# Patient Record
Sex: FEMALE | Race: BLACK OR AFRICAN AMERICAN | Employment: UNEMPLOYED | URBAN - METROPOLITAN AREA
[De-identification: names, ages, dates, MRNs, and addresses within clinical notes are randomized per-mention and may not be internally consistent; named-entity substitution may affect disease eponyms.]

---
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
## 2019-02-25 ENCOUNTER — HOSPITAL ENCOUNTER (EMERGENCY)
Age: 19
Discharge: HOME | End: 2019-02-25
Payer: COMMERCIAL

## 2019-02-25 VITALS
TEMPERATURE: 98 F | RESPIRATION RATE: 16 BRPM | WEIGHT: 120 LBS | OXYGEN SATURATION: 98 % | SYSTOLIC BLOOD PRESSURE: 99 MMHG | BODY MASS INDEX: 19.29 KG/M2 | HEIGHT: 66 IN | HEART RATE: 90 BPM | DIASTOLIC BLOOD PRESSURE: 52 MMHG

## 2019-02-25 DIAGNOSIS — N30.00 ACUTE CYSTITIS WITHOUT HEMATURIA: Primary | ICD-10-CM

## 2019-02-25 LAB
BACTERIA: ABNORMAL /HPF
BILIRUBIN URINE: NEGATIVE
BLOOD, URINE: NEGATIVE
CLARITY: CLEAR
COLOR: YELLOW
EPITHELIAL CELLS, UA: ABNORMAL /HPF
GLUCOSE URINE: NEGATIVE MG/DL
HCG, URINE, POC: NEGATIVE
KETONES, URINE: NEGATIVE MG/DL
LEUKOCYTE ESTERASE, URINE: ABNORMAL
Lab: NORMAL
NEGATIVE QC PASS/FAIL: NORMAL
NITRITE, URINE: POSITIVE
PH UA: 5.5 (ref 5–9)
POSITIVE QC PASS/FAIL: NORMAL
PROTEIN UA: NEGATIVE MG/DL
RBC UA: ABNORMAL /HPF (ref 0–2)
SPECIFIC GRAVITY UA: 1.02 (ref 1–1.03)
UROBILINOGEN, URINE: 0.2 E.U./DL
WBC UA: ABNORMAL /HPF (ref 0–5)

## 2019-02-25 PROCEDURE — 99284 EMERGENCY DEPT VISIT MOD MDM: CPT

## 2019-02-25 PROCEDURE — 87088 URINE BACTERIA CULTURE: CPT

## 2019-02-25 PROCEDURE — 87186 SC STD MICRODIL/AGAR DIL: CPT

## 2019-02-25 PROCEDURE — 81001 URINALYSIS AUTO W/SCOPE: CPT

## 2019-02-25 RX ORDER — CEPHALEXIN 500 MG/1
500 CAPSULE ORAL 4 TIMES DAILY
Qty: 28 CAPSULE | Refills: 0 | Status: SHIPPED | OUTPATIENT
Start: 2019-02-25 | End: 2019-03-04

## 2019-02-25 ASSESSMENT — PAIN SCALES - GENERAL: PAINLEVEL_OUTOF10: 2

## 2019-02-25 ASSESSMENT — PAIN DESCRIPTION - PAIN TYPE: TYPE: ACUTE PAIN

## 2019-02-25 ASSESSMENT — PAIN DESCRIPTION - LOCATION: LOCATION: ABDOMEN

## 2019-02-27 LAB
ORGANISM: ABNORMAL
URINE CULTURE, ROUTINE: ABNORMAL
URINE CULTURE, ROUTINE: ABNORMAL

## 2019-04-13 ENCOUNTER — HOSPITAL ENCOUNTER (EMERGENCY)
Age: 19
Discharge: HOME | End: 2019-04-13
Payer: COMMERCIAL

## 2019-04-13 VITALS
SYSTOLIC BLOOD PRESSURE: 103 MMHG | HEIGHT: 66 IN | RESPIRATION RATE: 18 BRPM | WEIGHT: 121 LBS | BODY MASS INDEX: 19.44 KG/M2 | TEMPERATURE: 97.8 F | DIASTOLIC BLOOD PRESSURE: 67 MMHG | HEART RATE: 88 BPM | OXYGEN SATURATION: 98 %

## 2019-04-13 DIAGNOSIS — N30.00 ACUTE CYSTITIS WITHOUT HEMATURIA: ICD-10-CM

## 2019-04-13 DIAGNOSIS — N93.8 DYSFUNCTIONAL UTERINE BLEEDING: Primary | ICD-10-CM

## 2019-04-13 LAB
BACTERIA: ABNORMAL /HPF
BASOPHILS ABSOLUTE: 0.03 E9/L (ref 0–0.2)
BASOPHILS RELATIVE PERCENT: 0.7 % (ref 0–2)
BILIRUBIN URINE: NEGATIVE
BLOOD, URINE: ABNORMAL
CLARITY: ABNORMAL
COLOR: ABNORMAL
EOSINOPHILS ABSOLUTE: 0.02 E9/L (ref 0.05–0.5)
EOSINOPHILS RELATIVE PERCENT: 0.5 % (ref 0–6)
EPITHELIAL CELLS, UA: ABNORMAL /HPF
GLUCOSE URINE: NEGATIVE MG/DL
HCG, URINE, POC: NEGATIVE
HCT VFR BLD CALC: 33 % (ref 34–48)
HEMOGLOBIN: 10.7 G/DL (ref 11.5–15.5)
IMMATURE GRANULOCYTES #: 0.01 E9/L
IMMATURE GRANULOCYTES %: 0.2 % (ref 0–5)
KETONES, URINE: NEGATIVE MG/DL
LEUKOCYTE ESTERASE, URINE: ABNORMAL
LYMPHOCYTES ABSOLUTE: 1.36 E9/L (ref 1.5–4)
LYMPHOCYTES RELATIVE PERCENT: 31.2 % (ref 20–42)
Lab: NORMAL
MCH RBC QN AUTO: 23.4 PG (ref 26–35)
MCHC RBC AUTO-ENTMCNC: 32.4 % (ref 32–34.5)
MCV RBC AUTO: 72.2 FL (ref 80–99.9)
MONOCYTES ABSOLUTE: 0.51 E9/L (ref 0.1–0.95)
MONOCYTES RELATIVE PERCENT: 11.7 % (ref 2–12)
NEGATIVE QC PASS/FAIL: NORMAL
NEUTROPHILS ABSOLUTE: 2.43 E9/L (ref 1.8–7.3)
NEUTROPHILS RELATIVE PERCENT: 55.7 % (ref 43–80)
NITRITE, URINE: POSITIVE
PDW BLD-RTO: 15.9 FL (ref 11.5–15)
PH UA: 5.5 (ref 5–9)
PLATELET # BLD: 293 E9/L (ref 130–450)
PMV BLD AUTO: 10.2 FL (ref 7–12)
POSITIVE QC PASS/FAIL: NORMAL
PROTEIN UA: 30 MG/DL
RBC # BLD: 4.57 E12/L (ref 3.5–5.5)
RBC UA: >20 /HPF (ref 0–2)
SPECIFIC GRAVITY UA: 1.01 (ref 1–1.03)
UROBILINOGEN, URINE: 0.2 E.U./DL
WBC # BLD: 4.4 E9/L (ref 4.5–11.5)
WBC UA: ABNORMAL /HPF (ref 0–5)

## 2019-04-13 PROCEDURE — 99284 EMERGENCY DEPT VISIT MOD MDM: CPT

## 2019-04-13 PROCEDURE — 36415 COLL VENOUS BLD VENIPUNCTURE: CPT

## 2019-04-13 PROCEDURE — 81001 URINALYSIS AUTO W/SCOPE: CPT

## 2019-04-13 PROCEDURE — 85025 COMPLETE CBC W/AUTO DIFF WBC: CPT

## 2019-04-13 PROCEDURE — 6370000000 HC RX 637 (ALT 250 FOR IP): Performed by: PHYSICIAN ASSISTANT

## 2019-04-13 RX ORDER — NITROFURANTOIN 25; 75 MG/1; MG/1
100 CAPSULE ORAL 2 TIMES DAILY
Qty: 10 CAPSULE | Refills: 0 | Status: SHIPPED | OUTPATIENT
Start: 2019-04-13 | End: 2019-04-18

## 2019-04-13 RX ORDER — IBUPROFEN 600 MG/1
600 TABLET ORAL ONCE
Status: COMPLETED | OUTPATIENT
Start: 2019-04-13 | End: 2019-04-13

## 2019-04-13 RX ORDER — IBUPROFEN 600 MG/1
600 TABLET ORAL EVERY 6 HOURS PRN
Qty: 60 TABLET | Refills: 0 | Status: SHIPPED | OUTPATIENT
Start: 2019-04-13 | End: 2019-10-18

## 2019-04-13 RX ADMIN — IBUPROFEN 600 MG: 600 TABLET, FILM COATED ORAL at 16:12

## 2019-04-13 ASSESSMENT — PAIN SCALES - GENERAL
PAINLEVEL_OUTOF10: 5
PAINLEVEL_OUTOF10: 6

## 2019-04-13 ASSESSMENT — PAIN DESCRIPTION - LOCATION: LOCATION: ABDOMEN

## 2019-04-13 ASSESSMENT — PAIN DESCRIPTION - PAIN TYPE: TYPE: ACUTE PAIN

## 2019-04-13 ASSESSMENT — PAIN DESCRIPTION - ORIENTATION: ORIENTATION: LOWER;RIGHT;LEFT

## 2019-04-13 NOTE — ED PROVIDER NOTES
[facility]      HPI:  4/13/19,   Time: 3:27 PM         Erik Huddleston is a 20 y.o. female presenting to the ED for vaginal bleeding, beginning 1 day ago. The complaint has been persistent, moderate in severity, and worsened by nothing. The patient is a 24-year-old young girl who states that she had her last Depo injection last fall. She reports that she would've been due for her next shot in February. The patient states that she skipped cycles in February and March. Last night she started with heavy vaginal bleeding. She reports that she's been changing her tampon very frequently. She denies any dizziness or lightheadedness. She is having some mild cramping diffusely across the lower abdominal region and pelvis. The patient states that she does not believe she is pregnant. Her pregnancy test here in the ED is negative. She reports that she just had a pelvic exam within the last few weeks; quite center with cultures. No vaginal discharge reported        ROS:     Constitutional: Negative for fever and chills  HENT: Negative for ear pain, sore throat and sinus pressure  Eyes: Negative for pain, discharge and redness  Respiratory:  Negative for shortness of breath, cough and wheezing  Cardiovascular: Negative for CP, edema or palpitations  Gastrointestinal:  See HPI  Genitourinary:  See HPI  Musculoskeletal: Negative for back pain and arthralgia  Skin: Negative for rash and wound  Neurological: Negative for weakness and headaches  Hematological: Negative for adenopathy    All other systems reviewed and are negative      -------------------------------- PAST HISTORY ----------------------------------  Past Medical History:  has a past medical history of Anemia and Sickle cell trait ([facility] [address] Past Surgical History:  has no past surgical history on file. Social History:  reports that she has never smoked. She has never used smokeless tobacco. She reports that she does not drink alcohol or use drugs.     Family POC Pregnancy Urine   Result Value Ref Range    HCG, Urine, POC Negative Negative    Lot Number [id number]     Positive QC Pass/Fail Pass     Negative QC Pass/Fail Pass        RADIOLOGY:  Interpreted by Radiologist.  No orders to display       ----------------- NURSING NOTES AND VITALS REVIEWED ---------------   The nursing notes within the ED encounter and vital signs as below have been reviewed. /67   Pulse 88   Temp 97.8 °F (36.6 °C) (Oral)   Resp 18   Ht 5' 6\" (1.676 m)   Wt 121 lb (54.9 kg)   LMP 12/19/2018 (Within Months)   SpO2 98%   BMI 19.53 kg/m²   Oxygen Saturation Interpretation: Normal      --------------------------------PHYSICAL EXAM------------------------------------      Constitutional/General: Alert and oriented x3, well appearing, non toxic in NAD  Head: NC/AT  Eyes: PERRL, EOMI  Mouth: Oropharynx clear, handling secretions, no trismus  Neck: Supple, full ROM, no meningeal signs  Pulmonary: Lungs clear to auscultation bilaterally, no wheezes, rales, or rhonchi. Not in respiratory distress  Cardiovascular:  Regular rate and rhythm, no murmurs, gallops, or rubs. 2+ distal pulses  Abdomen: Soft, + BS. No distension. Very mild suprapubic discomfort noted. No palpable rigidity, rebound or guarding  Extremities: Moves all extremities x 4. Warm and well perfused  Skin: warm and dry without rash  Neurologic: GCS 15,  Intact. No focal deficits  Psych: Normal Affect      ------------------------ ED COURSE/MEDICAL DECISION MAKING----------------------  Medications   ibuprofen (ADVIL;MOTRIN) tablet 600 mg (600 mg Oral Given 4/13/19 1612)         Medical Decision Making:    NH is stable. She is having heavier than usual flow secondary to having skipped the last 3 menstrual cycles and probably even more prior to this with the Depakote. She is not pregnant. She has some mild bacteria and possible early infection in the urine. Plan discharge home with Motrin and Macrobid.  Close follow-up

## 2019-08-31 ENCOUNTER — HOSPITAL ENCOUNTER (EMERGENCY)
Age: 19
Discharge: HOME | End: 2019-08-31
Payer: COMMERCIAL

## 2019-08-31 VITALS
SYSTOLIC BLOOD PRESSURE: 103 MMHG | RESPIRATION RATE: 16 BRPM | HEART RATE: 86 BPM | OXYGEN SATURATION: 99 % | DIASTOLIC BLOOD PRESSURE: 67 MMHG | TEMPERATURE: 98.1 F | HEIGHT: 66 IN | WEIGHT: 116 LBS | BODY MASS INDEX: 18.64 KG/M2

## 2019-08-31 DIAGNOSIS — R30.0 DYSURIA: Primary | ICD-10-CM

## 2019-08-31 LAB
BILIRUBIN URINE: NEGATIVE
BLOOD, URINE: NEGATIVE
CLARITY: CLEAR
COLOR: YELLOW
GLUCOSE URINE: NEGATIVE MG/DL
HCG, URINE, POC: NEGATIVE
KETONES, URINE: NEGATIVE MG/DL
LEUKOCYTE ESTERASE, URINE: NEGATIVE
Lab: NORMAL
NEGATIVE QC PASS/FAIL: NORMAL
NITRITE, URINE: NEGATIVE
PH UA: 5.5 (ref 5–9)
POSITIVE QC PASS/FAIL: NORMAL
PROTEIN UA: NEGATIVE MG/DL
SPECIFIC GRAVITY UA: >=1.03 (ref 1–1.03)
UROBILINOGEN, URINE: 0.2 E.U./DL

## 2019-08-31 PROCEDURE — 81003 URINALYSIS AUTO W/O SCOPE: CPT

## 2019-08-31 PROCEDURE — 87186 SC STD MICRODIL/AGAR DIL: CPT

## 2019-08-31 PROCEDURE — 6370000000 HC RX 637 (ALT 250 FOR IP): Performed by: PHYSICIAN ASSISTANT

## 2019-08-31 PROCEDURE — 87077 CULTURE AEROBIC IDENTIFY: CPT

## 2019-08-31 PROCEDURE — 99283 EMERGENCY DEPT VISIT LOW MDM: CPT

## 2019-08-31 PROCEDURE — 87088 URINE BACTERIA CULTURE: CPT

## 2019-08-31 RX ORDER — PHENAZOPYRIDINE HYDROCHLORIDE 100 MG/1
100 TABLET, FILM COATED ORAL 3 TIMES DAILY PRN
Qty: 9 TABLET | Refills: 0 | Status: SHIPPED | OUTPATIENT
Start: 2019-08-31 | End: 2019-09-03

## 2019-08-31 RX ORDER — NAPROXEN 500 MG/1
500 TABLET ORAL 2 TIMES DAILY
Qty: 14 TABLET | Refills: 0 | Status: SHIPPED | OUTPATIENT
Start: 2019-08-31 | End: 2019-10-18

## 2019-08-31 RX ORDER — PHENAZOPYRIDINE HYDROCHLORIDE 100 MG/1
200 TABLET, FILM COATED ORAL ONCE
Status: COMPLETED | OUTPATIENT
Start: 2019-08-31 | End: 2019-08-31

## 2019-08-31 RX ADMIN — PHENAZOPYRIDINE HYDROCHLORIDE 200 MG: 100 TABLET ORAL at 19:50

## 2019-08-31 ASSESSMENT — PAIN SCALES - GENERAL: PAINLEVEL_OUTOF10: 5

## 2019-08-31 NOTE — ED PROVIDER NOTES
Clarity, UA Clear Clear    Glucose, Ur Negative Negative mg/dL    Bilirubin Urine Negative Negative    Ketones, Urine Negative Negative mg/dL    Specific Gravity, UA >=1.030 1.005 - 1.030    Blood, Urine Negative Negative    pH, UA 5.5 5.0 - 9.0    Protein, UA Negative Negative mg/dL    Urobilinogen, Urine 0.2 <2.0 E.U./dL    Nitrite, Urine Negative Negative    Leukocyte Esterase, Urine Negative Negative   POC Pregnancy Urine Qual   Result Value Ref Range    HCG, Urine, POC Negative Negative    Lot Number [id number]     Positive QC Pass/Fail Pass     Negative QC Pass/Fail Pass        RADIOLOGY:  Interpreted by Radiologist.  No orders to display       ------------------------- NURSING NOTES AND VITALS REVIEWED ---------------------------   The nursing notes within the ED encounter and vital signs as below have been reviewed. /67   Pulse 86   Temp 98.1 °F (36.7 °C) (Oral)   Resp 16   Ht 5' 6\" (1.676 m)   Wt 116 lb (52.6 kg)   LMP 06/30/2019 (LMP Unknown)   SpO2 99%   BMI 18.72 kg/m²   Oxygen Saturation Interpretation: Normal      ---------------------------------------------------PHYSICAL EXAM--------------------------------------      Constitutional/General: Alert and oriented x3, well appearing, non toxic in NAD  Head: Normocephalic and atraumatic  Eyes: PERRL, EOMI  Mouth: Oropharynx clear, handling secretions, no trismus  Neck: Supple, full ROM,   Pulmonary: Lungs clear to auscultation bilaterally, no wheezes, rales, or rhonchi. Not in respiratory distress  Cardiovascular:  Regular rate and rhythm, no murmurs, gallops, or rubs. 2+ distal pulses  Abdomen: Soft, non tender, non distended, no CVA tenderness  Extremities: Moves all extremities x 4.  Warm and well perfused  Skin: warm and dry without rash  Neurologic: GCS 15,  Psych: Normal Affect      ------------------------------ ED COURSE/MEDICAL DECISION MAKING----------------------  Medications   phenazopyridine (PYRIDIUM) tablet 200 mg (200 mg Oral Given 8/31/19 1950)         ED COURSE:     Review of pharmacy records shows patient is presently on Cipro had taken Macrobid prior    [birth year]25 Patient updated       Medical Decision Making:    She came in with complaint of urinary frequency urgency burning. She is presently on Cipro being treated for UTI was given Macrobid prior. Urine here is normal.  Culture was sent she was placed on Pyridium she is to follow-up with her primary care on Tuesday for evaluation of the culture and any further management. She is also to follow-up with OB/GYN on Tuesday. Counseling: The emergency provider has spoken with the patient and discussed todays results, in addition to providing specific details for the plan of care and counseling regarding the diagnosis and prognosis. Questions are answered at this time and they are agreeable with the plan.      --------------------------------- IMPRESSION AND DISPOSITION ---------------------------------    IMPRESSION  1. Dysuria        DISPOSITION  Disposition: Discharge to home  Patient condition is good      NOTE: This report was transcribed using voice recognition software.  Every effort was made to ensure accuracy; however, inadvertent computerized transcription errors may be present     Quan De Los Santos, [address]  08/31/19 1679

## 2019-09-03 LAB
ORGANISM: ABNORMAL
URINE CULTURE, ROUTINE: ABNORMAL

## 2019-10-18 ENCOUNTER — HOSPITAL ENCOUNTER (EMERGENCY)
Age: 19
Discharge: HOME | End: 2019-10-18
Attending: EMERGENCY MEDICINE
Payer: COMMERCIAL

## 2019-10-18 VITALS
DIASTOLIC BLOOD PRESSURE: 70 MMHG | OXYGEN SATURATION: 100 % | WEIGHT: 119 LBS | BODY MASS INDEX: 19.21 KG/M2 | RESPIRATION RATE: 16 BRPM | SYSTOLIC BLOOD PRESSURE: 105 MMHG | HEART RATE: 92 BPM | TEMPERATURE: 98 F

## 2019-10-18 DIAGNOSIS — K08.89 PAIN, DENTAL: Primary | ICD-10-CM

## 2019-10-18 DIAGNOSIS — G44.209 TENSION HEADACHE: ICD-10-CM

## 2019-10-18 DIAGNOSIS — K08.89 DENTALGIA: ICD-10-CM

## 2019-10-18 PROCEDURE — G0382 LEV 3 HOSP TYPE B ED VISIT: HCPCS

## 2019-10-18 RX ORDER — KETOROLAC TROMETHAMINE 10 MG/1
10 TABLET, FILM COATED ORAL EVERY 6 HOURS PRN
Qty: 20 TABLET | Refills: 0 | Status: SHIPPED | OUTPATIENT
Start: 2019-10-18 | End: 2020-07-27

## 2019-10-18 RX ORDER — AMOXICILLIN AND CLAVULANATE POTASSIUM 875; 125 MG/1; MG/1
1 TABLET, FILM COATED ORAL 2 TIMES DAILY
Qty: 20 TABLET | Refills: 0 | Status: SHIPPED | OUTPATIENT
Start: 2019-10-18 | End: 2019-10-28

## 2019-10-18 ASSESSMENT — ENCOUNTER SYMPTOMS
EYES NEGATIVE: 1
RESPIRATORY NEGATIVE: 1
GASTROINTESTINAL NEGATIVE: 1
ALLERGIC/IMMUNOLOGIC NEGATIVE: 1

## 2019-10-18 ASSESSMENT — PAIN DESCRIPTION - FREQUENCY: FREQUENCY: CONTINUOUS

## 2019-10-18 ASSESSMENT — PAIN SCALES - GENERAL: PAINLEVEL_OUTOF10: 8

## 2019-10-18 ASSESSMENT — PAIN DESCRIPTION - DESCRIPTORS: DESCRIPTORS: ACHING;THROBBING

## 2019-10-18 ASSESSMENT — PAIN DESCRIPTION - PAIN TYPE: TYPE: ACUTE PAIN

## 2019-10-18 ASSESSMENT — PAIN DESCRIPTION - LOCATION: LOCATION: HEAD;MOUTH

## 2019-10-18 ASSESSMENT — PAIN DESCRIPTION - PROGRESSION
CLINICAL_PROGRESSION: NOT CHANGED
CLINICAL_PROGRESSION: NOT CHANGED

## 2020-07-27 ENCOUNTER — INITIAL PRENATAL (OUTPATIENT)
Dept: OBGYN CLINIC | Age: 20
End: 2020-07-27
Payer: COMMERCIAL

## 2020-07-27 VITALS
SYSTOLIC BLOOD PRESSURE: 102 MMHG | TEMPERATURE: 97.2 F | HEART RATE: 74 BPM | WEIGHT: 141.4 LBS | DIASTOLIC BLOOD PRESSURE: 64 MMHG | BODY MASS INDEX: 22.82 KG/M2

## 2020-07-27 PROBLEM — Z3A.31 31 WEEKS GESTATION OF PREGNANCY: Status: ACTIVE | Noted: 2020-07-27

## 2020-07-27 LAB
GLUCOSE URINE, POC: NEGATIVE
PROTEIN UA: NEGATIVE

## 2020-07-27 PROCEDURE — 81002 URINALYSIS NONAUTO W/O SCOPE: CPT | Performed by: OBSTETRICS & GYNECOLOGY

## 2020-07-27 PROCEDURE — 76819 FETAL BIOPHYS PROFIL W/O NST: CPT | Performed by: OBSTETRICS & GYNECOLOGY

## 2020-07-27 PROCEDURE — 99203 OFFICE O/P NEW LOW 30 MIN: CPT | Performed by: OBSTETRICS & GYNECOLOGY

## 2020-07-27 PROCEDURE — 99213 OFFICE O/P EST LOW 20 MIN: CPT | Performed by: OBSTETRICS & GYNECOLOGY

## 2020-07-27 PROCEDURE — 76811 OB US DETAILED SNGL FETUS: CPT | Performed by: OBSTETRICS & GYNECOLOGY

## 2020-07-27 NOTE — PATIENT INSTRUCTIONS
Baby's Kicks: Care Instructions. \"     If you do not have an account, please click on the \"Sign Up Now\" link. Current as of: 2020               Content Version: 12.5   [employer]. Care instructions adapted under license by [employer] [facility] ([facility]). If you have questions about a medical condition or this instruction, always ask your healthcare professional. [place] [address] any warranty or liability for your use of this information. Patient Education        Weeks 30 to 28 of Your Pregnancy: Care Instructions  Your Care Instructions     You have made it to the final months of your pregnancy. By now, your baby is really starting to look like a baby, with hair and plump skin. As you enter the final weeks of pregnancy, the reality of having a baby may start to set in. This is the time to settle on a name, get your household in order, set up a safe nursery, and find quality  if needed. Doing these things in advance will allow you to focus on caring for and enjoying your new baby. You may also want to have a tour of your hospital's labor and delivery unit to get a better idea of what to expect while you are in the hospital.  During these last months, it is very important to take good care of yourself and pay attention to what your body needs. If your doctor says it is okay for you to work, don't push yourself too hard. Use the tips provided in this care sheet to ease heartburn and care for varicose veins. If you haven't already had the Tdap shot during this pregnancy, talk to your doctor about getting it. It will help protect your  against pertussis infection. Follow-up care is a key part of your treatment and safety. Be sure to make and go to all appointments, and call your doctor if you are having problems. It's also a good idea to know your test results and keep a list of the medicines you take. How can you care for yourself at home?   Pay attention to your baby's movements  · You should feel your baby move several times every day. · Your baby now turns less, and kicks and jabs more. · Your baby sleeps 20 to 45 minutes at a time and is more active at certain times of day. · If your doctor wants you to count your baby's kicks:  ? Empty your bladder, and lie on your side or relax in a comfortable chair. ? Write down your start time. ? Pay attention only to your baby's movements. Count any movement except hiccups. ? After you have counted 10 movements, write down your stop time. ? Write down how many minutes it took for your baby to move 10 times. ? If an hour goes by and you have not recorded 10 movements, have something to eat or drink and then count for another hour. If you do not record 10 movements in either hour, call your doctor. Ease heartburn  · Eat small, frequent meals. · Do not eat chocolate, peppermint, or very spicy foods. Avoid drinks with caffeine, such as coffee, tea, and sodas. · Avoid bending over or lying down after meals. · Talk a short walk after you eat. · If heartburn is a problem at night, do not eat for 2 hours before bedtime. · Take antacids like Mylanta, Maalox, Rolaids, or Tums. Do not take antacids that have sodium bicarbonate. Care for varicose veins  · Varicose veins are blood vessels that stretch out with the extra blood during pregnancy. Your legs may ache or throb. Most varicose veins will go away after the birth. · Avoid standing for long periods of time. Sit with your legs crossed at the ankles, not the knees. · Sit with your feet propped up. · Avoid tight clothing or stockings. Wear support hose. · Exercise regularly. Try walking for at least 30 minutes a day. Where can you learn more? Go to https://edyta.[employer]. org and sign in to your [employer] account. Enter B100 in the [employer] box to learn more about \"Weeks 30 to 32 of Your Pregnancy: Care Instructions. \"     If you do not have an account, please click on the \"Sign Up Now\" link. Current as of: February 11, 2020               Content Version: 12.5  © 2006-2020 [employer]. Care instructions adapted under license by [facility] ([facility]). If you have questions about a medical condition or this instruction, always ask your healthcare professional. [address] any warranty or liability for your use of this information.

## 2020-07-27 NOTE — PROGRESS NOTES
[facility] MATERNAL FETAL MEDICINE  [facility] [address]  Ph: [phone]    Fax: [phone]   July 27, 2020  RE: Madan Lilly [birth year]  Dear Dr. Brumfield Sneha:       Thank you for sending   Ms. eGiger   for ultrasound  in our office   on 7/27/20. Med Hx: MOB Sickle Trait, THC exposure.:   An ultrasound evaluation was done today. Please refer to the enclosed copy of the ultrasound report for further detailed information. ULTRASOUND IMPRESSION:    ? Within developmental, habitus and technical limits of ultrasound assessment,   ? Vertex Female (?) @  31w  ? Active, responsive baby. The amniotic fluid volume appears normal.   ? The fetus is measuring appropriate for gestational age. ? There has been good interval growth and development. ? Fetal anatomy was reviewed and appears normal.  ? Soft markers for aneuploidy are examined and found to be favorable. - ~Many of the important findings cannot be fully assessed at this gestational age  ? The biophysical profile is reassuring with a score of 8/8.   ? Patient notes fetal movement, no cramping or contraction. No undue tenderness or distress during exam.   DISCUSSION; Overall favorable report today. RECOMMENDATIONS:  1. The patient is to continue to follow with you in your office for ongoing obstetric care. 2. Further evaluation and management will be dependent on her clinical presentation and the results of her testing. Once again, thank you for allowing us to participate in the care of this patient and if we can be of any further assistance to you, please do not hesitate to contact us. Sincerely,      Jody Coreas MD    I spent 16 minutes with the patient of which greater than 50% of the time was used to  the patient, discuss complications and problems related to her pregnancy, or coordinating her care. I answered all of her questions to her satisfaction.

## 2020-07-27 NOTE — PROGRESS NOTES
States baby is active Denies bleeding leakage of fluid or contractions. Perineal pressure noted  Do kick counts daily  Good to do same time of day for the same amount of time  Should have 10 kick /1 hour, if less: decrease in fetal movement call OB. If not able to get OB, go directly to L&D due to decreased fetal movement  Call your obstetrician for:    Bleeding, leakage of fluid, abdominal tenderness, headaches, burred vision or  epigastric pain or decreased fetal movement or increased in urinary frequency.    Call if any questions or concerns

## 2020-09-21 ENCOUNTER — ANESTHESIA EVENT (OUTPATIENT)
Dept: LABOR AND DELIVERY | Age: 20
DRG: 560 | End: 2020-09-21
Payer: COMMERCIAL

## 2020-09-21 ENCOUNTER — ANESTHESIA (OUTPATIENT)
Dept: LABOR AND DELIVERY | Age: 20
DRG: 560 | End: 2020-09-21
Payer: COMMERCIAL

## 2020-09-21 ENCOUNTER — APPOINTMENT (OUTPATIENT)
Dept: LABOR AND DELIVERY | Age: 20
DRG: 560 | End: 2020-09-21
Payer: COMMERCIAL

## 2020-09-21 ENCOUNTER — HOSPITAL ENCOUNTER (INPATIENT)
Age: 20
LOS: 2 days | Discharge: HOME | DRG: 560 | End: 2020-09-23
Attending: OBSTETRICS & GYNECOLOGY | Admitting: OBSTETRICS & GYNECOLOGY
Payer: COMMERCIAL

## 2020-09-21 PROBLEM — O26.90 COMPLICATED PREGNANCY, UNSPECIFIED TRIMESTER: Status: ACTIVE | Noted: 2020-09-21

## 2020-09-21 LAB
ABO/RH: NORMAL
AMNISURE, POC: POSITIVE
AMPHETAMINE SCREEN, URINE: NOT DETECTED
ANION GAP SERPL CALCULATED.3IONS-SCNC: 12 MMOL/L (ref 7–16)
ANISOCYTOSIS: ABNORMAL
ANTIBODY SCREEN: NORMAL
BARBITURATE SCREEN URINE: NOT DETECTED
BASOPHILS ABSOLUTE: 0 E9/L (ref 0–0.2)
BASOPHILS RELATIVE PERCENT: 0.1 % (ref 0–2)
BENZODIAZEPINE SCREEN, URINE: NOT DETECTED
BUN BLDV-MCNC: 3 MG/DL (ref 6–20)
BURR CELLS: ABNORMAL
CALCIUM SERPL-MCNC: 9.1 MG/DL (ref 8.6–10.2)
CANNABINOID SCREEN URINE: POSITIVE
CHLORIDE BLD-SCNC: 101 MMOL/L (ref 98–107)
CO2: 19 MMOL/L (ref 22–29)
COCAINE METABOLITE SCREEN URINE: NOT DETECTED
CREAT SERPL-MCNC: 0.5 MG/DL (ref 0.5–1)
EOSINOPHILS ABSOLUTE: 0.07 E9/L (ref 0.05–0.5)
EOSINOPHILS RELATIVE PERCENT: 0.9 % (ref 0–6)
FENTANYL SCREEN, URINE: NOT DETECTED
GFR AFRICAN AMERICAN: >60
GFR NON-AFRICAN AMERICAN: >60 ML/MIN/1.73
GLUCOSE BLD-MCNC: 133 MG/DL (ref 74–99)
HCT VFR BLD CALC: 28.5 % (ref 34–48)
HEMOGLOBIN: 8.7 G/DL (ref 11.5–15.5)
HYPOCHROMIA: ABNORMAL
LYMPHOCYTES ABSOLUTE: 1.14 E9/L (ref 1.5–4)
LYMPHOCYTES RELATIVE PERCENT: 14.8 % (ref 20–42)
Lab: ABNORMAL
Lab: NORMAL
MCH RBC QN AUTO: 20.1 PG (ref 26–35)
MCHC RBC AUTO-ENTMCNC: 30.5 % (ref 32–34.5)
MCV RBC AUTO: 66 FL (ref 80–99.9)
METHADONE SCREEN, URINE: NOT DETECTED
MONOCYTES ABSOLUTE: 0.3 E9/L (ref 0.1–0.95)
MONOCYTES RELATIVE PERCENT: 4.3 % (ref 2–12)
NEGATIVE QC PASS/FAIL: NORMAL
NEUTROPHILS ABSOLUTE: 6 E9/L (ref 1.8–7.3)
NEUTROPHILS RELATIVE PERCENT: 79.1 % (ref 43–80)
OPIATE SCREEN URINE: NOT DETECTED
OXYCODONE URINE: NOT DETECTED
PDW BLD-RTO: 16.2 FL (ref 11.5–15)
PHENCYCLIDINE SCREEN URINE: NOT DETECTED
PLASMA CELLS PERCENT: 0.9 % (ref 0–0)
PLATELET # BLD: 257 E9/L (ref 130–450)
PMV BLD AUTO: 10.9 FL (ref 7–12)
POIKILOCYTES: ABNORMAL
POLYCHROMASIA: ABNORMAL
POSITIVE QC PASS/FAIL: NORMAL
POTASSIUM SERPL-SCNC: 3.2 MMOL/L (ref 3.5–5)
RBC # BLD: 4.32 E12/L (ref 3.5–5.5)
SODIUM BLD-SCNC: 132 MMOL/L (ref 132–146)
WBC # BLD: 7.6 E9/L (ref 4.5–11.5)

## 2020-09-21 PROCEDURE — 85025 COMPLETE CBC W/AUTO DIFF WBC: CPT

## 2020-09-21 PROCEDURE — 36415 COLL VENOUS BLD VENIPUNCTURE: CPT

## 2020-09-21 PROCEDURE — 80307 DRUG TEST PRSMV CHEM ANLYZR: CPT

## 2020-09-21 PROCEDURE — G0480 DRUG TEST DEF 1-7 CLASSES: HCPCS

## 2020-09-21 PROCEDURE — 2500000003 HC RX 250 WO HCPCS: Performed by: ANESTHESIOLOGY

## 2020-09-21 PROCEDURE — 7200000001 HC VAGINAL DELIVERY

## 2020-09-21 PROCEDURE — 80048 BASIC METABOLIC PNL TOTAL CA: CPT

## 2020-09-21 PROCEDURE — 84112 EVAL AMNIOTIC FLUID PROTEIN: CPT

## 2020-09-21 PROCEDURE — 86901 BLOOD TYPING SEROLOGIC RH(D): CPT

## 2020-09-21 PROCEDURE — 6370000000 HC RX 637 (ALT 250 FOR IP): Performed by: OBSTETRICS & GYNECOLOGY

## 2020-09-21 PROCEDURE — 51702 INSERT TEMP BLADDER CATH: CPT

## 2020-09-21 PROCEDURE — 86900 BLOOD TYPING SEROLOGIC ABO: CPT

## 2020-09-21 PROCEDURE — 3700000025 EPIDURAL BLOCK: Performed by: ANESTHESIOLOGY

## 2020-09-21 PROCEDURE — 2580000003 HC RX 258: Performed by: OBSTETRICS & GYNECOLOGY

## 2020-09-21 PROCEDURE — 1220000001 HC SEMI PRIVATE L&D R&B

## 2020-09-21 PROCEDURE — 86850 RBC ANTIBODY SCREEN: CPT

## 2020-09-21 PROCEDURE — 6360000002 HC RX W HCPCS: Performed by: OBSTETRICS & GYNECOLOGY

## 2020-09-21 RX ORDER — ONDANSETRON 2 MG/ML
4 INJECTION INTRAMUSCULAR; INTRAVENOUS EVERY 6 HOURS PRN
Status: DISCONTINUED | OUTPATIENT
Start: 2020-09-21 | End: 2020-09-21

## 2020-09-21 RX ORDER — ACETAMINOPHEN 325 MG/1
650 TABLET ORAL EVERY 4 HOURS PRN
Status: DISCONTINUED | OUTPATIENT
Start: 2020-09-21 | End: 2020-09-23 | Stop reason: HOSPADM

## 2020-09-21 RX ORDER — METHYLERGONOVINE MALEATE 0.2 MG/ML
200 INJECTION INTRAVENOUS
Status: ACTIVE | OUTPATIENT
Start: 2020-09-21 | End: 2020-09-21

## 2020-09-21 RX ORDER — IBUPROFEN 600 MG/1
600 TABLET ORAL EVERY 6 HOURS PRN
Status: DISCONTINUED | OUTPATIENT
Start: 2020-09-21 | End: 2020-09-23 | Stop reason: HOSPADM

## 2020-09-21 RX ORDER — EPHEDRINE SULFATE/0.9% NACL/PF 50 MG/5 ML
5 SYRINGE (ML) INTRAVENOUS PRN
Status: DISCONTINUED | OUTPATIENT
Start: 2020-09-21 | End: 2020-09-21

## 2020-09-21 RX ORDER — NALBUPHINE HCL 10 MG/ML
5 AMPUL (ML) INJECTION EVERY 4 HOURS PRN
Status: DISCONTINUED | OUTPATIENT
Start: 2020-09-21 | End: 2020-09-21

## 2020-09-21 RX ORDER — 0.9 % SODIUM CHLORIDE 0.9 %
500 INTRAVENOUS SOLUTION INTRAVENOUS ONCE
Status: DISCONTINUED | OUTPATIENT
Start: 2020-09-21 | End: 2020-09-23 | Stop reason: HOSPADM

## 2020-09-21 RX ORDER — MISOPROSTOL 200 UG/1
800 TABLET ORAL PRN
Status: DISCONTINUED | OUTPATIENT
Start: 2020-09-21 | End: 2020-09-23 | Stop reason: HOSPADM

## 2020-09-21 RX ORDER — DOCUSATE SODIUM 100 MG/1
100 CAPSULE, LIQUID FILLED ORAL 2 TIMES DAILY
Status: DISCONTINUED | OUTPATIENT
Start: 2020-09-21 | End: 2020-09-23 | Stop reason: HOSPADM

## 2020-09-21 RX ORDER — SODIUM CHLORIDE 0.9 % (FLUSH) 0.9 %
10 SYRINGE (ML) INJECTION EVERY 12 HOURS SCHEDULED
Status: DISCONTINUED | OUTPATIENT
Start: 2020-09-21 | End: 2020-09-21

## 2020-09-21 RX ORDER — NALOXONE HYDROCHLORIDE 0.4 MG/ML
0.4 INJECTION, SOLUTION INTRAMUSCULAR; INTRAVENOUS; SUBCUTANEOUS PRN
Status: DISCONTINUED | OUTPATIENT
Start: 2020-09-21 | End: 2020-09-21

## 2020-09-21 RX ORDER — SODIUM CHLORIDE 0.9 % (FLUSH) 0.9 %
10 SYRINGE (ML) INJECTION PRN
Status: DISCONTINUED | OUTPATIENT
Start: 2020-09-21 | End: 2020-09-21

## 2020-09-21 RX ORDER — SODIUM CHLORIDE 0.9 % (FLUSH) 0.9 %
10 SYRINGE (ML) INJECTION PRN
Status: DISCONTINUED | OUTPATIENT
Start: 2020-09-21 | End: 2020-09-23 | Stop reason: HOSPADM

## 2020-09-21 RX ORDER — MODIFIED LANOLIN
OINTMENT (GRAM) TOPICAL PRN
Status: DISCONTINUED | OUTPATIENT
Start: 2020-09-21 | End: 2020-09-23 | Stop reason: HOSPADM

## 2020-09-21 RX ORDER — SODIUM CHLORIDE 0.9 % (FLUSH) 0.9 %
10 SYRINGE (ML) INJECTION EVERY 12 HOURS SCHEDULED
Status: DISCONTINUED | OUTPATIENT
Start: 2020-09-21 | End: 2020-09-23 | Stop reason: HOSPADM

## 2020-09-21 RX ORDER — SODIUM CHLORIDE, SODIUM LACTATE, POTASSIUM CHLORIDE, CALCIUM CHLORIDE 600; 310; 30; 20 MG/100ML; MG/100ML; MG/100ML; MG/100ML
INJECTION, SOLUTION INTRAVENOUS CONTINUOUS
Status: DISCONTINUED | OUTPATIENT
Start: 2020-09-21 | End: 2020-09-21

## 2020-09-21 RX ADMIN — DOCUSATE SODIUM 100 MG: 100 CAPSULE, LIQUID FILLED ORAL at 20:41

## 2020-09-21 RX ADMIN — IBUPROFEN 600 MG: 600 TABLET ORAL at 14:21

## 2020-09-21 RX ADMIN — Medication 15 ML/HR: at 11:03

## 2020-09-21 RX ADMIN — IBUPROFEN 600 MG: 600 TABLET ORAL at 20:41

## 2020-09-21 RX ADMIN — SODIUM CHLORIDE, POTASSIUM CHLORIDE, SODIUM LACTATE AND CALCIUM CHLORIDE: 600; 310; 30; 20 INJECTION, SOLUTION INTRAVENOUS at 09:08

## 2020-09-21 RX ADMIN — Medication 999 MILLI-UNITS/MIN: at 12:40

## 2020-09-21 RX ADMIN — SODIUM CHLORIDE, POTASSIUM CHLORIDE, SODIUM LACTATE AND CALCIUM CHLORIDE: 600; 310; 30; 20 INJECTION, SOLUTION INTRAVENOUS at 10:21

## 2020-09-21 ASSESSMENT — PAIN SCALES - GENERAL
PAINLEVEL_OUTOF10: 0
PAINLEVEL_OUTOF10: 6
PAINLEVEL_OUTOF10: 3

## 2020-09-21 NOTE — ANESTHESIA PROCEDURE NOTES
Epidural Block    Patient location during procedure: OB  Start time: 9/21/2020 10:55 AM  End time: 9/21/2020 11:01 AM  Reason for block: labor epidural  Staffing  Anesthesiologist: Kisha Sahu MD  Resident/CRNA: KYLIE Lowe CRNA  Other anesthesia staff: Raeann Jeffers RN  Performed: other anesthesia staff   Preanesthetic Checklist  Completed: patient identified, site marked, surgical consent, pre-op evaluation, timeout performed, IV checked, risks and benefits discussed, monitors and equipment checked, anesthesia consent given, oxygen available and patient being monitored  Epidural  Patient position: sitting  Prep: ChloraPrep  Patient monitoring: cardiac monitor, continuous pulse ox and frequent blood pressure checks  Approach: midline  Location: lumbar (1-5)  Injection technique: RENE air  Provider prep: mask and sterile gloves  Needle  Needle type: Tuohy   Needle gauge: 18 G  Needle length: 2.5 in  Catheter type: end hole  Catheter size: 20 G.   Test dose: negative  Assessment  Sensory level: T6  Hemodynamics: stable  Attempts: 1

## 2020-09-21 NOTE — PROGRESS NOTES
Single liveborn male delivered at 200 via spontaneous vaginal delivery. Tactile stimulation and bulb suction provided to  on mother's abdomen. Apgars 9/9.

## 2020-09-21 NOTE — CARE COORDINATION
SS Note: SW Consult received for [facility] use in pregnancy; late prenatal care\". Per review of charts, MOB tested positive for Cannabinoids, still waiting UDS results for baby. SW to meet with MOB after UDS results available in baby's chart.   Electronically signed by NEMESIO Mixon on 9/21/2020 at 3:22 PM

## 2020-09-21 NOTE — PROGRESS NOTES
Ann Britt CRNA and Jim Jeffers RN in room for procedure at [phone]. Patient sitting at side of bed for epidural insertion at 1044. Epidural catheter placed by Jim Jeffers RN at [phone]. Test dose given by Jim Jeffers RN at 1100. Patient returned to semi-jose's position in bed at 1102. Epidural bolus given by Jim Jeffers RN at 0355. Epidural pump programmed to continuously infuse by Jim Jeffers RN at 0146.

## 2020-09-21 NOTE — PROCEDURES
VAGINAL DELIVERY NOTE        HEAD POSITION: robert    PLACENTA:  3VC spontaneous intact    COMPLICATIONS:  None    NUCHAL CORD:  no    SHOULDER DYSTOCIA:  no    EBL:  150    LACERATIONS:  none    SPECIMENS:  placenta    BABY:  male    Mom and baby to usual care    Cassie Hoffman  2020

## 2020-09-21 NOTE — H&P
Department of Obstetrics and Gynecology  Attending Obstetrics History and Physical        CHIEF COMPLAINT:  Contraction     HISTORY OF PRESENT ILLNESS:        The patient is a 23 y.o. female , Patient's last menstrual period was 2020.,  at 39w0d. OB History        3    Para   1    Term   1            AB   1    Living   1       SAB   1    TAB        Ectopic        Molar        Multiple   0    Live Births   1            Patient presents with a chief complaint as above and is being admitted for labor    Estimated Due Date: Estimated Date of Delivery: 20    PRENATAL CARE:    Complicated by:   Patient Active Problem List   Diagnosis Code    Sickle cell trait ([facility] [address] D57.3    31 weeks gestation of pregnancy E3N.38    Complicated pregnancy, unspecified trimester O26.90       PAST OB HISTORY  OB History        3    Para   1    Term   1            AB   1    Living   1       SAB   1    TAB        Ectopic        Molar        Multiple   0    Live Births   1                Past Medical History:        Diagnosis Date    Anemia     Headache     [facility]      Past Surgical History:    History reviewed. No pertinent surgical history. Social History:    TOBACCO:   reports that she has never smoked. She has never used smokeless tobacco.  ETOH:   reports previous alcohol use. DRUGS:   reports previous drug use. Drug: Marijuana.   Family History:       Problem Relation Age of Onset    Bipolar Disorder Mother     Depression Mother     Schizophrenia Father     Bipolar Disorder Father      Medications Prior to Admission:  Medications Prior to Admission: [DISCONTINUED] Prenatal Vit-Fe Fumarate-FA (PRENATAL VITAMINS PO), Take 1 tablet by mouth daily  Allergies:  No known allergies and Seasonal    Review of Systems:   Ears, nose, mouth, throat, and face: negative  Respiratory: negative  Cardiovascular: negative  Gastrointestinal: negative  Genitourinary:negative  Integument/breast: negative  Hematologic/lymphatic: negative  Musculoskeletal:negative  Neurological: negative  Behavioral/Psych: negative  Endocrine: negative  Allergic/Immunologic: negative    PHYSICAL EXAM:    General appearance:  awake, alert, cooperative, no apparent distress, and appears stated age  Neurologic:  Awake, alert, oriented to name, place and time. Cranial nerves II-XII are grossly intact. Motor is 5 out of 5 bilaterally. Cerebellar finger to nose, heel to shin intact. Sensory is intact.   Babinski down going, Romberg negative, and gait is normal.  Lungs:  No increased work of breathing, good air exchange, clear to auscultation bilaterally, no crackles or wheezing  Heart:  Normal apical impulse, regular rate and rhythm, normal S1 and S2, no S3 or S4, and no murmur noted  Abdomen:  No scars, normal bowel sounds, soft, non-distended, non-tender, no masses palpated, no hepatosplenomegally  Fetal heart rate:  Baseline Heart Rate 140, accelerations:  present  Pelvis:  External Genitalia: General appearance; normal, Hair distribution; normal, Lesions absent  Cervix:    DILATION:   cm  EFFACEMENT:   STATION:   cm    Contraction frequency:  2 minutes  Membranes:  Intact    Ht 5' 6\" (1.676 m)   Wt 148 lb (67.1 kg)   LMP 01/06/2020   BMI 23.89 kg/m²     General Labs:    CBC:   Lab Results   Component Value Date    WBC 4.4 04/13/2019    RBC 4.57 04/13/2019    HGB 10.7 04/13/2019    HCT 33.0 04/13/2019    MCV 72.2 04/13/2019    RDW 15.9 04/13/2019     04/13/2019     CMP:    Lab Results   Component Value Date     12/21/2018    K 4.0 12/21/2018     12/21/2018    CO2 24 12/21/2018    BUN 8 12/21/2018     U/A:  No components found for: Kathya Erm, USPGRAV, UPH, UPROTEIN, UGLUCOSE, UKETONE, UBILI, UBLOOD, UNITRITE, UUROBIL, ULEUKEST, USQEPI, URENEPI, UWBC, URBC, Synchari, Samson Schwab    ASSESSMENT AND PLAN:  Labor 39 weeks          Electronically signed by Sana Limon MD on 9/21/2020 at 9:39 AM

## 2020-09-21 NOTE — PROGRESS NOTES
RN at bedside continuously, monitoring fetal heart rate and uterine activity every 5 minutes while patient is pushing.

## 2020-09-21 NOTE — ANESTHESIA PRE PROCEDURE
Department of Anesthesiology  Preprocedure Note       Name:  Nora Stubbs   Age:  23 y.o.  :  2000                                          MRN:  [id number]         Date:  2020      Surgeon: * No surgeons listed *    Procedure: * No procedures listed *    Medications prior to admission:   Prior to Admission medications    Not on File       Current medications:    Current Facility-Administered Medications   Medication Dose Route Frequency Provider Last Rate Last Dose    lactated ringers infusion   Intravenous Continuous Areli Webb  mL/hr at 20 1021      sodium chloride flush 0.9 % injection 10 mL  10 mL Intravenous 2 times per day Areli Webb MD        sodium chloride flush 0.9 % injection 10 mL  10 mL Intravenous PRN Areli Webb MD        ondansetron [facility]) injection 4 mg  4 mg Intravenous Q6H PRN Areli Webb MD        oxytocin (PITOCIN) 30 units in 500 mL infusion  1 pamela-units/min Intravenous Continuous PRN Areli Webb MD        fentaNYL 1.85mcg/ml and bupivacaine 0.1% 15ml syringe (Home Care) (OB) 15 mL epidural  15 mL Epidural Once Nathan Purcell MD        fentaNYL 1.85mcg/ml and Bupivicaine 0.1% in 0.9% NS 135ml infusion (OB) epidural  15 mL/hr Epidural Continuous Nathan Purcell MD        naloxone [facility]) injection 0.4 mg  0.4 mg Intravenous PRN Nathan Purcell MD        nalbuphine (NUBAIN) injection 5 mg  5 mg Intravenous Q4H PRN MD Aiden Green [facility]) injection 4 mg  4 mg Intravenous Q6H PRN Nathan Purcell MD        ePHEDrine injection 5 mg  5 mg Intravenous PRN Nathan Purcell MD           Allergies:     Allergies   Allergen Reactions    No Known Allergies     Seasonal        Problem List:    Patient Active Problem List   Diagnosis Code    Sickle cell trait (HCC) D57.3    31 weeks gestation of pregnancy M7H.24    Complicated pregnancy, unspecified trimester O26.90       Past Medical History:        Diagnosis Date    Anemia     Headache     Sickle cell trait [facility])        Past Surgical History:  History reviewed. No pertinent surgical history. Social History:    Social History     Tobacco Use    Smoking status: Never Smoker    Smokeless tobacco: Never Used   Substance Use Topics    Alcohol use: Not Currently                                Counseling given: Not Answered      Vital Signs (Current):   Vitals:    09/21/20 0848 09/21/20 0856 09/21/20 0940 09/21/20 1026   BP: 107/63  104/63 100/64   Pulse: 78  82 88   Resp: 16 20 18   Temp: 36.6 °C (97.9 °F)   36.5 °C (97.7 °F)   TempSrc: Oral   Oral   Weight:  148 lb (67.1 kg)     Height:  5' 6\" (1.676 m)                                                BP Readings from Last 3 Encounters:   09/21/20 100/64   07/27/20 102/64   10/18/19 105/70       NPO Status: Time of last liquid consumption: 2200                        Time of last solid consumption: 2200                        Date of last liquid consumption: 09/20/20                        Date of last solid food consumption: 09/20/20    BMI:   Wt Readings from Last 3 Encounters:   09/21/20 148 lb (67.1 kg)   07/27/20 141 lb 6.4 oz (64.1 kg)   10/18/19 119 lb (54 kg)     Body mass index is 23.89 kg/m². CBC:   Lab Results   Component Value Date    WBC 7.6 09/21/2020    RBC 4.32 09/21/2020    HGB 8.7 09/21/2020    HCT 28.5 09/21/2020    MCV 66.0 09/21/2020    RDW 16.2 09/21/2020     09/21/2020       CMP:   Lab Results   Component Value Date     09/21/2020    K 3.2 09/21/2020     09/21/2020    CO2 19 09/21/2020    BUN 3 09/21/2020    CREATININE 0.5 09/21/2020    GFRAA >60 09/21/2020    LABGLOM >60 09/21/2020    GLUCOSE 133 09/21/2020    CALCIUM 9.1 09/21/2020       POC Tests: No results for input(s): POCGLU, POCNA, POCK, POCCL, POCBUN, POCHEMO, POCHCT in the last 72 hours.     Coags: No results found for: PROTIME, INR, APTT    HCG (If Applicable):   Lab Results   Component Value Date    PREGTESTUR negative 12/21/2018        ABGs: No results found for: PHART, PO2ART, MRR6OSH, JHQ8LNI, BEART, D3KIRVNK     Type & Screen (If Applicable):  No results found for: LABABO, LABRH    Drug/Infectious Status (If Applicable):  No results found for: HIV, HEPCAB    COVID-19 Screening (If Applicable): No results found for: COVID19      Anesthesia Evaluation  Patient summary reviewed and Nursing notes reviewed no history of anesthetic complications:   Airway: Mallampati: II  TM distance: >3 FB   Neck ROM: full  Mouth opening: > = 3 FB Dental: normal exam         Pulmonary:                              Cardiovascular:  Exercise tolerance: good (>4 METS),           Rhythm: regular  Rate: normal                    Neuro/Psych:   (+) headaches: cluster headaches,             GI/Hepatic/Renal:             Endo/Other:                     Abdominal:           Vascular:                                        Anesthesia Plan      epidural     ASA 2             Anesthetic plan and risks discussed with patient. Plan discussed with attending.                   KYLIE Huang - CRNA   9/21/2020

## 2020-09-22 LAB
HCT VFR BLD CALC: 26.1 % (ref 34–48)
HEMOGLOBIN: 8 G/DL (ref 11.5–15.5)
MCH RBC QN AUTO: 20.3 PG (ref 26–35)
MCHC RBC AUTO-ENTMCNC: 30.7 % (ref 32–34.5)
MCV RBC AUTO: 66.1 FL (ref 80–99.9)
PDW BLD-RTO: 16.3 FL (ref 11.5–15)
PLATELET # BLD: 233 E9/L (ref 130–450)
PMV BLD AUTO: 10.5 FL (ref 7–12)
RBC # BLD: 3.95 E12/L (ref 3.5–5.5)
WBC # BLD: 8.8 E9/L (ref 4.5–11.5)

## 2020-09-22 PROCEDURE — 99231 SBSQ HOSP IP/OBS SF/LOW 25: CPT | Performed by: ADVANCED PRACTICE MIDWIFE

## 2020-09-22 PROCEDURE — 6370000000 HC RX 637 (ALT 250 FOR IP): Performed by: OBSTETRICS & GYNECOLOGY

## 2020-09-22 PROCEDURE — 85027 COMPLETE CBC AUTOMATED: CPT

## 2020-09-22 PROCEDURE — 36415 COLL VENOUS BLD VENIPUNCTURE: CPT

## 2020-09-22 PROCEDURE — 1220000001 HC SEMI PRIVATE L&D R&B

## 2020-09-22 RX ADMIN — ACETAMINOPHEN 650 MG: 325 TABLET, FILM COATED ORAL at 23:04

## 2020-09-22 RX ADMIN — DOCUSATE SODIUM 100 MG: 100 CAPSULE, LIQUID FILLED ORAL at 08:32

## 2020-09-22 RX ADMIN — DOCUSATE SODIUM 100 MG: 100 CAPSULE, LIQUID FILLED ORAL at 21:09

## 2020-09-22 RX ADMIN — IBUPROFEN 600 MG: 600 TABLET ORAL at 16:57

## 2020-09-22 RX ADMIN — IBUPROFEN 600 MG: 600 TABLET ORAL at 08:38

## 2020-09-22 RX ADMIN — IBUPROFEN 600 MG: 600 TABLET ORAL at 01:07

## 2020-09-22 ASSESSMENT — PAIN SCALES - GENERAL
PAINLEVEL_OUTOF10: 3
PAINLEVEL_OUTOF10: 5
PAINLEVEL_OUTOF10: 4
PAINLEVEL_OUTOF10: 2
PAINLEVEL_OUTOF10: 5

## 2020-09-22 ASSESSMENT — PAIN DESCRIPTION - LOCATION
LOCATION: ABDOMEN
LOCATION: ABDOMEN

## 2020-09-22 ASSESSMENT — PAIN DESCRIPTION - PAIN TYPE
TYPE: ACUTE PAIN
TYPE: ACUTE PAIN

## 2020-09-22 ASSESSMENT — PAIN DESCRIPTION - FREQUENCY: FREQUENCY: INTERMITTENT

## 2020-09-22 ASSESSMENT — PAIN DESCRIPTION - DESCRIPTORS
DESCRIPTORS: CRAMPING
DESCRIPTORS: CRAMPING

## 2020-09-22 ASSESSMENT — PAIN DESCRIPTION - RADICULAR PAIN: RADICULAR_PAIN: ABSENT

## 2020-09-22 NOTE — CARE COORDINATION
SS Note:  Met with pt, Mar Felix, her boyfriend/father of baby, Jovanny Ridley present but sleeping, , Pavithra GeigerMarceloGary in basinet and sleeping also, explained sw role and consult regarding her having a positive UDS for Cannabinoids and limited prenatal care, UDS on  negative, Mar Felix was pleasant and agreeable to speaking with sw and to answering questions for [employer] for Plan of Safe Care, she did admit to marijuana use during her pregnancy due to back pain from her pregnancy but states last use was a few weeks ago and that she does now plan to abstain from further use now that she has delivered, denies need for treatment or counseling, she feels that she is bonding with her baby and no other parenting concerns reported by nursing staff, she reports living with Swati Pablo and her 3year old son, Derrick Peralta, who is currently with his grandmother,she denies any hx with CSB, no mental health issues noted and she denies past or current feelings of post-partum depression, she reports having all provisions to take her baby home and to already calling for a [facility] appointment, she listed her Eleazar Tena (p# [phone]) as her support person for plan of care along with Swati Pablo, sw provided and reviewed Uriel Ashby BEREKET assessment will be reviewed with [employer], vm message left for  screener but anticipate agency will be \"screening out\" referral with no ongoing agency services required, NO HOLD on  for release home at discharge, nursing informed.  Electronically signed by NEMESIO Mckeon on 2020 at 11:15 AM

## 2020-09-22 NOTE — ANESTHESIA POSTPROCEDURE EVALUATION
Department of Anesthesiology  Postprocedure Note    Patient: Mark Hsu  MRN: [id number]  YOB: 2000  Date of evaluation: 9/21/2020  Time:  9:08 PM     Procedure Summary     Date:  09/21/20 Room / Location:      Anesthesia Start:  1043 Anesthesia Stop:  1240    Procedure:  Labor Analgesia Diagnosis:      Scheduled Providers:   Responsible Provider:  Sesar Catherine MD    Anesthesia Type:  epidural ASA Status:  2          Anesthesia Type: No value filed. Jaylene Phase I:      Jaylene Phase II:      Last vitals: Reviewed and per EMR flowsheets.        Anesthesia Post Evaluation    Patient location during evaluation: bedside  Patient participation: complete - patient participated  Level of consciousness: awake and alert  Airway patency: patent  Nausea & Vomiting: no nausea and no vomiting  Complications: no  Cardiovascular status: hemodynamically stable  Respiratory status: acceptable  Hydration status: euvolemic  Comments: Patient satisfied with epidural for labor

## 2020-09-22 NOTE — PROGRESS NOTES
Department of Obstetrics and Gynecology  Labor and Delivery  Post Partum Progress Note    Postpartum Day # 1    SUBJECTIVE:  Pt resting in bed, reports feeling well, denies emotional concerns. Infant feeding with out difficulty. Ambulatory per self.      OBJECTIVE:      Vitals:  BP (!) 87/50   Pulse 74   Temp 97.8 °F (36.6 °C) (Oral)   Resp 18   Ht 5' 6\" (1.676 m)   Wt 148 lb (67.1 kg)   LMP 01/06/2020   Breastfeeding Unknown   BMI 23.89 kg/m²     LUNGS:  No increased work of breathing, good air exchange, clear to auscultation bilaterally, no crackles or wheezing  CARDIOVASCULAR:  Normal apical impulse, regular rate and rhythm, normal S1 and S2, no S3 or S4, and no murmur noted  ABDOMEN:  normal bowel sounds, soft and non tender   GENITAL/URINARY:  External Genitalia:  General appearance; normal, Hair distribution; normal, Lesions absent  Uterus: @ 1 below U  Minimal Lochia       DATA:    Blood Type:  No results found for: ABOINT  RH:  No results found for: ANATITER, C3, C4, RF  CBC:    Lab Results   Component Value Date    WBC 8.8 09/22/2020    RBC 3.95 09/22/2020    HGB 8.0 09/22/2020    HCT 26.1 09/22/2020    MCV 66.1 09/22/2020    RDW 16.3 09/22/2020     09/22/2020       ASSESSMENT & PLAN:    SVB  PPD 1     Continue Current care

## 2020-09-22 NOTE — PLAN OF CARE
Problem: Anxiety:  Goal: Level of anxiety will decrease  Description: Level of anxiety will decrease  Outcome: Met This Shift     Problem: Breathing Pattern - Ineffective:  Goal: Able to breathe comfortably  Description: Able to breathe comfortably  Outcome: Met This Shift

## 2020-09-22 NOTE — PROGRESS NOTES
Assisted pt to room 17 at this time. Patient ambulatory to room 17. Room orientation provided, comfort needs met, call light within reach.

## 2020-09-22 NOTE — PROGRESS NOTES
Hearing screening results were discussed with parent. Questions answered. Brochure given to parent. Advised to monitor developmental milestones and contact physician for any concerns.    Diana Marie

## 2020-09-23 VITALS
WEIGHT: 148 LBS | TEMPERATURE: 98.2 F | HEIGHT: 66 IN | HEART RATE: 70 BPM | DIASTOLIC BLOOD PRESSURE: 60 MMHG | RESPIRATION RATE: 17 BRPM | SYSTOLIC BLOOD PRESSURE: 102 MMHG | BODY MASS INDEX: 23.78 KG/M2

## 2020-09-23 PROBLEM — O26.90 COMPLICATED PREGNANCY, UNSPECIFIED TRIMESTER: Status: RESOLVED | Noted: 2020-09-21 | Resolved: 2020-09-23

## 2020-09-23 PROBLEM — Z3A.31 31 WEEKS GESTATION OF PREGNANCY: Status: RESOLVED | Noted: 2020-07-27 | Resolved: 2020-09-23

## 2020-09-23 RX ORDER — IBUPROFEN 600 MG/1
600 TABLET ORAL EVERY 6 HOURS PRN
Qty: 20 TABLET | Refills: 0 | Status: SHIPPED | OUTPATIENT
Start: 2020-09-23 | End: 2021-01-13

## 2020-09-23 NOTE — DISCHARGE SUMMARY
[facility] Obstetrical Discharge Form         Patients Name  [facility]    Gestational Age:  39w0d    Antepartum complications: none    Date of Delivery:   9-      Type of Delivery:   vaginal, spontaneous    Delivered By:                 Sanaz Alcaraz:       Information for the patient's newbornJj Walker [[id number]]        Anesthesia:    Epidural       Intrapartum complications: None    Feeding method:   bottle - Similac with iron    Postpartum complications: none    Discharge Date:   9/23/2020  Condition:    Stable     Plan:   Follow up    in 6 week(s)

## 2020-09-23 NOTE — PROGRESS NOTES
CLINICAL PHARMACY NOTE: MEDS TO [address] Select Patient?: No  Total # of Prescriptions Filled: 1   The following medications were delivered to the patient:  · Ibuprofen 600 mg  Total # of Interventions Completed: 3  Time Spent (min): 15    Additional Documentation:

## 2020-09-23 NOTE — PROGRESS NOTES
Postpartum Day 2: Vaginal Delivery    The patient feels well. Pain is well controlled with current medications. Baby is feeding via bottle - Similac with iron. Objective:        Vitals:    09/23/20 0757   BP: 102/60   Pulse: 70   Resp: 17   Temp: 98.2 °F (36.8 °C)         Lab Results   Component Value Date    WBC 8.8 09/22/2020    HGB 8.0 (L) 09/22/2020    HCT 26.1 (L) 09/22/2020    MCV 66.1 (L) 09/22/2020     09/22/2020       General:    alert, appears stated age and cooperative   Lochia:  appropriate   Uterine    firm   DVT Evaluation:  No evidence of DVT seen on physical exam.     Assessment:     Status post Vaginal Delivery. good    Plan:     Discharge home with standard precautions and return to office 6 weeks

## 2020-09-26 LAB — CANNABINOIDS CONF, URINE: 24 NG/ML

## 2021-01-13 ENCOUNTER — HOSPITAL ENCOUNTER (EMERGENCY)
Age: 21
Discharge: HOME | End: 2021-01-13
Attending: EMERGENCY MEDICINE
Payer: COMMERCIAL

## 2021-01-13 VITALS
TEMPERATURE: 97.3 F | BODY MASS INDEX: 19.37 KG/M2 | RESPIRATION RATE: 16 BRPM | WEIGHT: 120 LBS | DIASTOLIC BLOOD PRESSURE: 54 MMHG | HEART RATE: 82 BPM | OXYGEN SATURATION: 98 % | SYSTOLIC BLOOD PRESSURE: 92 MMHG

## 2021-01-13 DIAGNOSIS — Z34.90 PREGNANCY, UNSPECIFIED GESTATIONAL AGE: Primary | ICD-10-CM

## 2021-01-13 DIAGNOSIS — O23.40 URINARY TRACT INFECTION IN MOTHER DURING PREGNANCY, ANTEPARTUM: ICD-10-CM

## 2021-01-13 LAB
AMORPHOUS: ABNORMAL
BACTERIA: ABNORMAL /HPF
BILIRUBIN URINE: NEGATIVE
BLOOD, URINE: NEGATIVE
CLARITY: ABNORMAL
COLOR: YELLOW
EPITHELIAL CELLS, UA: ABNORMAL /HPF
GLUCOSE URINE: NEGATIVE MG/DL
HCG(URINE) PREGNANCY TEST: POSITIVE
KETONES, URINE: NEGATIVE MG/DL
LEUKOCYTE ESTERASE, URINE: ABNORMAL
NITRITE, URINE: POSITIVE
PH UA: 7 (ref 5–9)
PROTEIN UA: NEGATIVE MG/DL
RBC UA: ABNORMAL /HPF (ref 0–2)
SPECIFIC GRAVITY UA: 1.01 (ref 1–1.03)
UROBILINOGEN, URINE: 0.2 E.U./DL
WBC UA: ABNORMAL /HPF (ref 0–5)

## 2021-01-13 PROCEDURE — 81025 URINE PREGNANCY TEST: CPT

## 2021-01-13 PROCEDURE — G0382 LEV 3 HOSP TYPE B ED VISIT: HCPCS

## 2021-01-13 PROCEDURE — 81001 URINALYSIS AUTO W/SCOPE: CPT

## 2021-01-13 RX ORDER — CEPHALEXIN 500 MG/1
500 CAPSULE ORAL 3 TIMES DAILY
Qty: 30 CAPSULE | Refills: 0 | Status: SHIPPED | OUTPATIENT
Start: 2021-01-13 | End: 2021-01-23

## 2021-01-13 ASSESSMENT — ENCOUNTER SYMPTOMS
COUGH: 0
ABDOMINAL DISTENTION: 0
VOMITING: 1
EYE DISCHARGE: 0
WHEEZING: 0
EYE REDNESS: 0
EYE PAIN: 0
BACK PAIN: 0
SORE THROAT: 0
SINUS PRESSURE: 0
NAUSEA: 1
DIARRHEA: 1
SHORTNESS OF BREATH: 0

## 2021-01-13 ASSESSMENT — PAIN DESCRIPTION - LOCATION: LOCATION: HEAD

## 2021-01-13 ASSESSMENT — PAIN DESCRIPTION - PROGRESSION
CLINICAL_PROGRESSION_2: NOT CHANGED
CLINICAL_PROGRESSION_2: NOT CHANGED
CLINICAL_PROGRESSION: NOT CHANGED

## 2021-01-13 ASSESSMENT — PAIN DESCRIPTION - INTENSITY: RATING_2: 4

## 2021-01-13 ASSESSMENT — PAIN DESCRIPTION - FREQUENCY: FREQUENCY: CONTINUOUS

## 2021-01-13 ASSESSMENT — PAIN DESCRIPTION - DESCRIPTORS: DESCRIPTORS: ACHING

## 2021-01-14 NOTE — ED PROVIDER NOTES
The history is provided by the patient. Illness   The current episode started 3 to 5 days ago. The onset was gradual. The problem is mild. Associated symptoms include diarrhea, nausea, vomiting and muscle aches. Pertinent negatives include no fever, no ear pain, no headaches, no sore throat, no cough, no wheezing, no rash, no eye discharge, no eye pain and no eye redness. Review of Systems   Constitutional: Negative for chills and fever. HENT: Negative for ear pain, sinus pressure and sore throat. Eyes: Negative for pain, discharge and redness. Respiratory: Negative for cough, shortness of breath and wheezing. Cardiovascular: Negative for chest pain. Gastrointestinal: Positive for diarrhea, nausea and vomiting. Negative for abdominal distention. Genitourinary: Negative for dysuria and frequency. Musculoskeletal: Negative for arthralgias and back pain. Skin: Negative for rash and wound. Neurological: Negative for weakness and headaches. Hematological: Negative for adenopathy. All other systems reviewed and are negative. Physical Exam  Vitals signs and nursing note reviewed. Constitutional:       Appearance: She is well-developed. HENT:      Head: Normocephalic and atraumatic. Right Ear: Hearing and external ear normal.      Left Ear: Hearing and external ear normal.      Nose: Nose normal.      Mouth/Throat:      Pharynx: Uvula midline. Eyes:      General: Lids are normal.      Conjunctiva/sclera: Conjunctivae normal.      Pupils: Pupils are equal, round, and reactive to light. Neck:      Musculoskeletal: Normal range of motion and neck supple. Cardiovascular:      Rate and Rhythm: Normal rate and regular rhythm. Heart sounds: Normal heart sounds. No murmur. Pulmonary:      Effort: Pulmonary effort is normal. No respiratory distress. Breath sounds: Normal breath sounds. No wheezing or rales.    Abdominal:      General: Bowel sounds are normal. Palpations: Abdomen is soft. Abdomen is not rigid. Tenderness: There is no abdominal tenderness. There is no guarding or rebound. Skin:     General: Skin is warm and dry. Findings: No abrasion or rash. Neurological:      Mental Status: She is alert and oriented to person, place, and time. GCS: GCS eye subscore is 4. GCS verbal subscore is 5. GCS motor subscore is 6. Cranial Nerves: No cranial nerve deficit. Sensory: No sensory deficit. Coordination: Coordination normal.      Gait: Gait normal.          Procedures     MDM        --------------------------------------------- PAST HISTORY ---------------------------------------------  Past Medical History:  has a past medical history of Anemia, Headache, and Sickle cell trait ([facility] [address] Past Surgical History:  has no past surgical history on file. Social History:  reports that she has never smoked. She has never used smokeless tobacco. She reports previous alcohol use. She reports previous drug use. Drug: Marijuana. Family History: family history includes Bipolar Disorder in her father and mother; Depression in her mother; Schizophrenia in her father. The patients home medications have been reviewed.     Allergies: No known allergies and Seasonal    -------------------------------------------------- RESULTS -------------------------------------------------  Labs:  Results for orders placed or performed during the hospital encounter of 01/13/21   Pregnancy, Urine   Result Value Ref Range    HCG(Urine) Pregnancy Test POSITIVE NEGATIVE   Urinalysis   Result Value Ref Range    Color, UA Yellow Straw/Yellow    Clarity, UA SL CLOUDY Clear    Glucose, Ur Negative Negative mg/dL    Bilirubin Urine Negative Negative    Ketones, Urine Negative Negative mg/dL    Specific Gravity, UA 1.015 1.005 - 1.030    Blood, Urine Negative Negative    pH, UA 7.0 5.0 - 9.0    Protein, UA Negative Negative mg/dL    Urobilinogen, Urine 0.2 <2.0 E.U./dL Nitrite, Urine POSITIVE (A) Negative    Leukocyte Esterase, Urine SMALL (A) Negative   Microscopic Urinalysis   Result Value Ref Range    WBC, UA 2-5 0 - 5 /HPF    RBC, UA 1-3 0 - 2 /HPF    Epithelial Cells, UA FEW /HPF    Bacteria, UA MANY (A) None Seen /HPF    Amorphous, UA RARE        Radiology:  No orders to display       ------------------------- NURSING NOTES AND VITALS REVIEWED ---------------------------  Date / Time Roomed:  1/13/2021  6:30 PM  ED Bed Assignment:  01/01    The nursing notes within the ED encounter and vital signs as below have been reviewed. BP (!) 92/54   Pulse 82   Temp 97.3 °F (36.3 °C) (Temporal)   Resp 16   Wt 120 lb (54.4 kg)   LMP  (LMP Unknown)   SpO2 98%   BMI 19.37 kg/m²   Oxygen Saturation Interpretation: Normal      ------------------------------------------ PROGRESS NOTES ------------------------------------------  I have spoken with the patient and discussed todays results, in addition to providing specific details for the plan of care and counseling regarding the diagnosis and prognosis. Their questions are answered at this time and they are agreeable with the plan. I discussed at length with them reasons for immediate return here for re evaluation. They will followup with primary care by calling their office tomorrow. --------------------------------- ADDITIONAL PROVIDER NOTES ---------------------------------  At this time the patient is without objective evidence of an acute process requiring hospitalization or inpatient management. They have remained hemodynamically stable throughout their entire ED visit and are stable for discharge with outpatient follow-up. The plan has been discussed in detail and they are aware of the specific conditions for emergent return, as well as the importance of follow-up. New Prescriptions    CEPHALEXIN (KEFLEX) 500 MG CAPSULE    Take 1 capsule by mouth 3 times daily for 10 days       Diagnosis:  1.  Pregnancy, unspecified gestational age    3. Urinary tract infection in mother during pregnancy, antepartum        Disposition:  Patient's disposition: Discharge to home  Patient's condition is stable.                       Guido Marie MD  01/13/21 8572

## 2021-03-09 ENCOUNTER — HOSPITAL ENCOUNTER (EMERGENCY)
Age: 21
Discharge: HOME | End: 2021-03-09
Attending: FAMILY MEDICINE
Payer: COMMERCIAL

## 2021-03-09 VITALS
HEART RATE: 98 BPM | BODY MASS INDEX: 19.37 KG/M2 | OXYGEN SATURATION: 98 % | SYSTOLIC BLOOD PRESSURE: 100 MMHG | TEMPERATURE: 97.5 F | DIASTOLIC BLOOD PRESSURE: 60 MMHG | WEIGHT: 120 LBS | RESPIRATION RATE: 16 BRPM

## 2021-03-09 DIAGNOSIS — Z34.91 FIRST TRIMESTER PREGNANCY: ICD-10-CM

## 2021-03-09 DIAGNOSIS — N30.00 ACUTE CYSTITIS WITHOUT HEMATURIA: Primary | ICD-10-CM

## 2021-03-09 LAB
BACTERIA: ABNORMAL /HPF
BILIRUBIN URINE: NEGATIVE
BLOOD, URINE: ABNORMAL
CLARITY: ABNORMAL
COLOR: YELLOW
EPITHELIAL CELLS, UA: ABNORMAL /HPF
GLUCOSE URINE: NEGATIVE MG/DL
HCG(URINE) PREGNANCY TEST: POSITIVE
KETONES, URINE: NEGATIVE MG/DL
LEUKOCYTE ESTERASE, URINE: ABNORMAL
NITRITE, URINE: POSITIVE
PH UA: 5.5 (ref 5–9)
PROTEIN UA: NEGATIVE MG/DL
RBC UA: ABNORMAL /HPF (ref 0–2)
SPECIFIC GRAVITY UA: 1.02 (ref 1–1.03)
UROBILINOGEN, URINE: 0.2 E.U./DL
WBC UA: ABNORMAL /HPF (ref 0–5)

## 2021-03-09 PROCEDURE — G0382 LEV 3 HOSP TYPE B ED VISIT: HCPCS

## 2021-03-09 PROCEDURE — 81025 URINE PREGNANCY TEST: CPT

## 2021-03-09 PROCEDURE — 81001 URINALYSIS AUTO W/SCOPE: CPT

## 2021-03-09 RX ORDER — NITROFURANTOIN 25; 75 MG/1; MG/1
100 CAPSULE ORAL 2 TIMES DAILY
Qty: 20 CAPSULE | Refills: 0 | Status: SHIPPED | OUTPATIENT
Start: 2021-03-09 | End: 2021-03-19

## 2021-03-09 RX ORDER — PHENAZOPYRIDINE HYDROCHLORIDE 200 MG/1
200 TABLET, FILM COATED ORAL 3 TIMES DAILY PRN
Qty: 6 TABLET | Refills: 0 | Status: SHIPPED | OUTPATIENT
Start: 2021-03-09 | End: 2021-03-12

## 2021-03-09 NOTE — ED PROVIDER NOTES
HPI:  3/9/21,   Time: 5:15 PM CARLOS Dixon is a 24 y.o. female presenting to the ED for a 2-day history of dysuria, frequency and urgency of urine. ROS:   Pertinent positives and negatives are stated within HPI, all other systems reviewed and are negative.  --------------------------------------------- PAST HISTORY ---------------------------------------------  Past Medical History:  has a past medical history of Anemia, Headache, and Sickle cell trait ([facility] [address] Past Surgical History:  has no past surgical history on file. Social History:  reports that she has never smoked. She has never used smokeless tobacco. She reports previous alcohol use. She reports previous drug use. Drug: Marijuana. Family History: family history includes Bipolar Disorder in her father and mother; Depression in her mother; Schizophrenia in her father. The patients home medications have been reviewed.     Allergies: No known allergies and Seasonal    -------------------------------------------------- RESULTS -------------------------------------------------  All laboratory and radiology results have been personally reviewed by myself   LABS:  Results for orders placed or performed during the hospital encounter of 03/09/21   Urinalysis   Result Value Ref Range    Color, UA Yellow Straw/Yellow    Clarity, UA CLOUDY (A) Clear    Glucose, Ur Negative Negative mg/dL    Bilirubin Urine Negative Negative    Ketones, Urine Negative Negative mg/dL    Specific Gravity, UA 1.025 1.005 - 1.030    Blood, Urine TRACE (A) Negative    pH, UA 5.5 5.0 - 9.0    Protein, UA Negative Negative mg/dL    Urobilinogen, Urine 0.2 <2.0 E.U./dL    Nitrite, Urine POSITIVE (A) Negative    Leukocyte Esterase, Urine MODERATE (A) Negative   Pregnancy, urine   Result Value Ref Range    HCG(Urine) Pregnancy Test POSITIVE NEGATIVE   Microscopic Urinalysis   Result Value Ref Range    WBC, UA 2-5 0 - 5 /HPF    RBC, UA 1-3 0 - 2 /HPF    Epithelial Cells, UA FEW /HPF    Bacteria, UA MANY (A) None Seen /HPF       RADIOLOGY:  Interpreted by Radiologist.  No orders to display       ------------------------- NURSING NOTES AND VITALS REVIEWED ---------------------------   The nursing notes within the ED encounter and vital signs as below have been reviewed. /60   Pulse 98   Temp 97.5 °F (36.4 °C) (Temporal)   Resp 16   Wt 120 lb (54.4 kg)   LMP  (LMP Unknown)   SpO2 98%   BMI 19.37 kg/m²   Oxygen Saturation Interpretation: Normal      ---------------------------------------------------PHYSICAL EXAM--------------------------------------    Constitutional/General: Alert and oriented x3, well appearing, non toxic in NAD  Head: NC/AT  Eyes: PERRL, EOMI  Mouth: Oropharynx clear, handling secretions, no trismus  Neck: Supple, full ROM, no meningeal signs  Pulmonary: Lungs clear to auscultation bilaterally, no wheezes, rales, or rhonchi. Not in respiratory distress  Cardiovascular:  Regular rate and rhythm, no murmurs, gallops, or rubs. 2+ distal pulses  Abdomen: Soft, non tender, non distended,   Extremities: Moves all extremities x 4. Warm and well perfused  Skin: warm and dry without rash  Neurologic: GCS 15,  Psych: Normal Affect      ------------------------------ ED COURSE/MEDICAL DECISION MAKING----------------------  Medications - No data to display      Medical Decision Making:    Simple    Counseling: The emergency provider has spoken with the patient and discussed todays results, in addition to providing specific details for the plan of care and counseling regarding the diagnosis and prognosis. Questions are answered at this time and they are agreeable with the plan.      --------------------------------- IMPRESSION AND DISPOSITION ---------------------------------    IMPRESSION  1. Acute cystitis without hematuria    2.  First trimester pregnancy        DISPOSITION  Disposition: Discharge to home  Patient condition is stable Steph Soriano MD  03/11/21 0965

## 2021-08-31 ENCOUNTER — ANESTHESIA (OUTPATIENT)
Dept: LABOR AND DELIVERY | Age: 21
DRG: 560 | End: 2021-08-31
Payer: COMMERCIAL

## 2021-08-31 ENCOUNTER — ANESTHESIA EVENT (OUTPATIENT)
Dept: LABOR AND DELIVERY | Age: 21
DRG: 560 | End: 2021-08-31
Payer: COMMERCIAL

## 2021-08-31 ENCOUNTER — HOSPITAL ENCOUNTER (INPATIENT)
Age: 21
LOS: 2 days | Discharge: HOME | DRG: 560 | End: 2021-09-02
Attending: OBSTETRICS & GYNECOLOGY | Admitting: OBSTETRICS & GYNECOLOGY
Payer: COMMERCIAL

## 2021-08-31 DIAGNOSIS — D50.9 IRON DEFICIENCY ANEMIA, UNSPECIFIED IRON DEFICIENCY ANEMIA TYPE: ICD-10-CM

## 2021-08-31 PROBLEM — Z34.90 TERM PREGNANCY: Status: ACTIVE | Noted: 2021-08-31

## 2021-08-31 LAB
ABO/RH: NORMAL
AMPHETAMINE SCREEN, URINE: NOT DETECTED
ANION GAP SERPL CALCULATED.3IONS-SCNC: 11 MMOL/L (ref 7–16)
ANTIBODY SCREEN: NORMAL
BARBITURATE SCREEN URINE: NOT DETECTED
BENZODIAZEPINE SCREEN, URINE: NOT DETECTED
BUN BLDV-MCNC: 3 MG/DL (ref 6–20)
CALCIUM SERPL-MCNC: 8.6 MG/DL (ref 8.6–10.2)
CANNABINOID SCREEN URINE: POSITIVE
CHLORIDE BLD-SCNC: 105 MMOL/L (ref 98–107)
CO2: 22 MMOL/L (ref 22–29)
COCAINE METABOLITE SCREEN URINE: NOT DETECTED
CREAT SERPL-MCNC: 0.5 MG/DL (ref 0.5–1)
FENTANYL SCREEN, URINE: NOT DETECTED
GFR AFRICAN AMERICAN: >60
GFR NON-AFRICAN AMERICAN: >60 ML/MIN/1.73
GLUCOSE BLD-MCNC: 86 MG/DL (ref 74–99)
HCT VFR BLD CALC: 26.8 % (ref 34–48)
HEMOGLOBIN: 8.5 G/DL (ref 11.5–15.5)
Lab: ABNORMAL
MCH RBC QN AUTO: 21.1 PG (ref 26–35)
MCHC RBC AUTO-ENTMCNC: 31.7 % (ref 32–34.5)
MCV RBC AUTO: 66.7 FL (ref 80–99.9)
METHADONE SCREEN, URINE: NOT DETECTED
OPIATE SCREEN URINE: NOT DETECTED
OXYCODONE URINE: NOT DETECTED
PDW BLD-RTO: 16.8 FL (ref 11.5–15)
PHENCYCLIDINE SCREEN URINE: NOT DETECTED
PLATELET # BLD: 264 E9/L (ref 130–450)
PMV BLD AUTO: 11.2 FL (ref 7–12)
POTASSIUM SERPL-SCNC: 3.7 MMOL/L (ref 3.5–5)
RBC # BLD: 4.02 E12/L (ref 3.5–5.5)
SARS-COV-2, NAAT: NOT DETECTED
SODIUM BLD-SCNC: 138 MMOL/L (ref 132–146)
WBC # BLD: 7.2 E9/L (ref 4.5–11.5)

## 2021-08-31 PROCEDURE — 86901 BLOOD TYPING SEROLOGIC RH(D): CPT

## 2021-08-31 PROCEDURE — 51702 INSERT TEMP BLADDER CATH: CPT

## 2021-08-31 PROCEDURE — 84112 EVAL AMNIOTIC FLUID PROTEIN: CPT

## 2021-08-31 PROCEDURE — 80048 BASIC METABOLIC PNL TOTAL CA: CPT

## 2021-08-31 PROCEDURE — 2580000003 HC RX 258: Performed by: ADVANCED PRACTICE MIDWIFE

## 2021-08-31 PROCEDURE — 7200000001 HC VAGINAL DELIVERY

## 2021-08-31 PROCEDURE — 6360000002 HC RX W HCPCS: Performed by: ANESTHESIOLOGY

## 2021-08-31 PROCEDURE — 36415 COLL VENOUS BLD VENIPUNCTURE: CPT

## 2021-08-31 PROCEDURE — 3700000025 EPIDURAL BLOCK: Performed by: ANESTHESIOLOGY

## 2021-08-31 PROCEDURE — 86850 RBC ANTIBODY SCREEN: CPT

## 2021-08-31 PROCEDURE — 87635 SARS-COV-2 COVID-19 AMP PRB: CPT

## 2021-08-31 PROCEDURE — 80307 DRUG TEST PRSMV CHEM ANLYZR: CPT

## 2021-08-31 PROCEDURE — G0480 DRUG TEST DEF 1-7 CLASSES: HCPCS

## 2021-08-31 PROCEDURE — 1220000001 HC SEMI PRIVATE L&D R&B

## 2021-08-31 PROCEDURE — 2500000003 HC RX 250 WO HCPCS: Performed by: ANESTHESIOLOGY

## 2021-08-31 PROCEDURE — 85027 COMPLETE CBC AUTOMATED: CPT

## 2021-08-31 PROCEDURE — 6360000002 HC RX W HCPCS: Performed by: ADVANCED PRACTICE MIDWIFE

## 2021-08-31 PROCEDURE — 99024 POSTOP FOLLOW-UP VISIT: CPT | Performed by: ADVANCED PRACTICE MIDWIFE

## 2021-08-31 PROCEDURE — 86900 BLOOD TYPING SEROLOGIC ABO: CPT

## 2021-08-31 RX ORDER — ONDANSETRON 2 MG/ML
4 INJECTION INTRAMUSCULAR; INTRAVENOUS EVERY 6 HOURS PRN
Status: DISCONTINUED | OUTPATIENT
Start: 2021-08-31 | End: 2021-08-31

## 2021-08-31 RX ORDER — SODIUM CHLORIDE 0.9 % (FLUSH) 0.9 %
10 SYRINGE (ML) INJECTION PRN
Status: DISCONTINUED | OUTPATIENT
Start: 2021-08-31 | End: 2021-09-02 | Stop reason: HOSPADM

## 2021-08-31 RX ORDER — SODIUM CHLORIDE 9 MG/ML
25 INJECTION, SOLUTION INTRAVENOUS PRN
Status: DISCONTINUED | OUTPATIENT
Start: 2021-08-31 | End: 2021-09-02 | Stop reason: HOSPADM

## 2021-08-31 RX ORDER — NALOXONE HYDROCHLORIDE 0.4 MG/ML
0.4 INJECTION, SOLUTION INTRAMUSCULAR; INTRAVENOUS; SUBCUTANEOUS PRN
Status: DISCONTINUED | OUTPATIENT
Start: 2021-08-31 | End: 2021-08-31

## 2021-08-31 RX ORDER — PENICILLIN G POTASSIUM 5000000 [IU]/1
INJECTION, POWDER, FOR SOLUTION INTRAMUSCULAR; INTRAVENOUS
Status: DISCONTINUED
Start: 2021-08-31 | End: 2021-08-31

## 2021-08-31 RX ORDER — LIDOCAINE HYDROCHLORIDE 10 MG/ML
INJECTION, SOLUTION EPIDURAL; INFILTRATION; INTRACAUDAL; PERINEURAL
Status: DISCONTINUED
Start: 2021-08-31 | End: 2021-08-31

## 2021-08-31 RX ORDER — EPHEDRINE SULFATE/0.9% NACL/PF 50 MG/5 ML
5 SYRINGE (ML) INTRAVENOUS PRN
Status: DISCONTINUED | OUTPATIENT
Start: 2021-08-31 | End: 2021-08-31

## 2021-08-31 RX ORDER — IBUPROFEN 800 MG/1
800 TABLET ORAL EVERY 8 HOURS PRN
Status: DISCONTINUED | OUTPATIENT
Start: 2021-08-31 | End: 2021-09-02 | Stop reason: HOSPADM

## 2021-08-31 RX ORDER — MODIFIED LANOLIN
OINTMENT (GRAM) TOPICAL PRN
Status: DISCONTINUED | OUTPATIENT
Start: 2021-08-31 | End: 2021-09-02 | Stop reason: HOSPADM

## 2021-08-31 RX ORDER — HYDROCODONE BITARTRATE AND ACETAMINOPHEN 5; 325 MG/1; MG/1
1 TABLET ORAL EVERY 4 HOURS PRN
Status: DISCONTINUED | OUTPATIENT
Start: 2021-08-31 | End: 2021-09-02 | Stop reason: HOSPADM

## 2021-08-31 RX ORDER — EPHEDRINE SULFATE/0.9% NACL/PF 50 MG/5 ML
5 SYRINGE (ML) INTRAVENOUS PRN
Status: DISCONTINUED | OUTPATIENT
Start: 2021-08-31 | End: 2021-08-31 | Stop reason: SDUPTHER

## 2021-08-31 RX ORDER — SODIUM CHLORIDE 9 MG/ML
25 INJECTION, SOLUTION INTRAVENOUS PRN
Status: DISCONTINUED | OUTPATIENT
Start: 2021-08-31 | End: 2021-08-31

## 2021-08-31 RX ORDER — SODIUM CHLORIDE, SODIUM LACTATE, POTASSIUM CHLORIDE, AND CALCIUM CHLORIDE .6; .31; .03; .02 G/100ML; G/100ML; G/100ML; G/100ML
500 INJECTION, SOLUTION INTRAVENOUS PRN
Status: DISCONTINUED | OUTPATIENT
Start: 2021-08-31 | End: 2021-08-31

## 2021-08-31 RX ORDER — BUTORPHANOL TARTRATE 1 MG/ML
1 INJECTION, SOLUTION INTRAMUSCULAR; INTRAVENOUS
Status: DISCONTINUED | OUTPATIENT
Start: 2021-08-31 | End: 2021-08-31

## 2021-08-31 RX ORDER — NALBUPHINE HCL 10 MG/ML
5 AMPUL (ML) INJECTION EVERY 4 HOURS PRN
Status: DISCONTINUED | OUTPATIENT
Start: 2021-08-31 | End: 2021-08-31

## 2021-08-31 RX ORDER — DOCUSATE SODIUM 100 MG/1
100 CAPSULE, LIQUID FILLED ORAL 2 TIMES DAILY
Status: DISCONTINUED | OUTPATIENT
Start: 2021-08-31 | End: 2021-08-31

## 2021-08-31 RX ORDER — PENICILLIN G 3000000 [IU]/50ML
3 INJECTION, SOLUTION INTRAVENOUS EVERY 4 HOURS
Status: DISCONTINUED | OUTPATIENT
Start: 2021-08-31 | End: 2021-08-31

## 2021-08-31 RX ORDER — SODIUM CHLORIDE 0.9 % (FLUSH) 0.9 %
5-40 SYRINGE (ML) INJECTION EVERY 12 HOURS SCHEDULED
Status: DISCONTINUED | OUTPATIENT
Start: 2021-08-31 | End: 2021-08-31

## 2021-08-31 RX ORDER — SODIUM CHLORIDE 0.9 % (FLUSH) 0.9 %
10 SYRINGE (ML) INJECTION EVERY 12 HOURS SCHEDULED
Status: DISCONTINUED | OUTPATIENT
Start: 2021-08-31 | End: 2021-09-02 | Stop reason: HOSPADM

## 2021-08-31 RX ORDER — ACETAMINOPHEN 325 MG/1
650 TABLET ORAL EVERY 4 HOURS PRN
Status: DISCONTINUED | OUTPATIENT
Start: 2021-08-31 | End: 2021-09-02 | Stop reason: HOSPADM

## 2021-08-31 RX ORDER — SODIUM CHLORIDE, SODIUM LACTATE, POTASSIUM CHLORIDE, CALCIUM CHLORIDE 600; 310; 30; 20 MG/100ML; MG/100ML; MG/100ML; MG/100ML
INJECTION, SOLUTION INTRAVENOUS CONTINUOUS
Status: DISCONTINUED | OUTPATIENT
Start: 2021-08-31 | End: 2021-08-31

## 2021-08-31 RX ORDER — SODIUM CHLORIDE 0.9 % (FLUSH) 0.9 %
5-40 SYRINGE (ML) INJECTION PRN
Status: DISCONTINUED | OUTPATIENT
Start: 2021-08-31 | End: 2021-08-31

## 2021-08-31 RX ORDER — SODIUM CHLORIDE, SODIUM LACTATE, POTASSIUM CHLORIDE, AND CALCIUM CHLORIDE .6; .31; .03; .02 G/100ML; G/100ML; G/100ML; G/100ML
1000 INJECTION, SOLUTION INTRAVENOUS PRN
Status: DISCONTINUED | OUTPATIENT
Start: 2021-08-31 | End: 2021-08-31

## 2021-08-31 RX ORDER — DOCUSATE SODIUM 100 MG/1
100 CAPSULE, LIQUID FILLED ORAL 2 TIMES DAILY
Status: DISCONTINUED | OUTPATIENT
Start: 2021-08-31 | End: 2021-09-02 | Stop reason: HOSPADM

## 2021-08-31 RX ORDER — HYDROCODONE BITARTRATE AND ACETAMINOPHEN 5; 325 MG/1; MG/1
2 TABLET ORAL EVERY 4 HOURS PRN
Status: DISCONTINUED | OUTPATIENT
Start: 2021-08-31 | End: 2021-09-02 | Stop reason: HOSPADM

## 2021-08-31 RX ADMIN — SODIUM CHLORIDE, POTASSIUM CHLORIDE, SODIUM LACTATE AND CALCIUM CHLORIDE 1000 ML: 600; 310; 30; 20 INJECTION, SOLUTION INTRAVENOUS at 15:45

## 2021-08-31 RX ADMIN — ONDANSETRON 4 MG: 2 INJECTION INTRAMUSCULAR; INTRAVENOUS at 17:38

## 2021-08-31 RX ADMIN — SODIUM CHLORIDE, POTASSIUM CHLORIDE, SODIUM LACTATE AND CALCIUM CHLORIDE 1000 ML: 600; 310; 30; 20 INJECTION, SOLUTION INTRAVENOUS at 15:09

## 2021-08-31 RX ADMIN — Medication 15 ML/HR: at 16:06

## 2021-08-31 RX ADMIN — DEXTROSE MONOHYDRATE 5 MILLION UNITS: 5 INJECTION INTRAVENOUS at 14:45

## 2021-08-31 RX ADMIN — Medication 909 MILLI-UNITS/MIN: at 18:02

## 2021-08-31 RX ADMIN — PENICILLIN G 3 MILLION UNITS: 3000000 INJECTION, SOLUTION INTRAVENOUS at 17:50

## 2021-08-31 ASSESSMENT — PAIN DESCRIPTION - DESCRIPTORS: DESCRIPTORS: ACHING;CRAMPING

## 2021-08-31 NOTE — PROGRESS NOTES
Patient arrived ambulatory to D states my water broke at 11am. Patient was leaking large amt of lt mec fluid. Marlin Hare did a vag exam -pt is 3cm

## 2021-08-31 NOTE — L&D DELIVERY NOTE
VAGINAL DELIVERY NOTE    PRE OP DX:   Term pregnancy, active labor, limited to no prenatal care    POST OP DX:  Same plus delivery of live female infant     PROCEDURE:      ANESTHESIA:  Epiudural    PLACENTA:  3VC spontaneous intact    MEDS:  None    COMPLICATIONS:  None    NUCHAL CORD:  None    SHOULDER DYSTOCIA:  None    EBL:  < 400 cc    LACERATIONS:  None    SPECIMENS:  None    BABY:  Live female infant, vertex positon, 6# 1 oz with Apgars 9/9    Mom and baby to usual care    Fei Rangel MD  2021

## 2021-08-31 NOTE — ANESTHESIA PRE PROCEDURE
Department of Anesthesiology  Preprocedure Note       Name:  Dieter Zhou   Age:  24 y.o.  :  2000                                          MRN:  [id number]         Date:  2021      Surgeon: * No surgeons listed *    Procedure: * No procedures listed *    Medications prior to admission:   Prior to Admission medications    Medication Sig Start Date End Date Taking?  Authorizing Provider   Prenatal Vit-Fe Fumarate-FA (PRENATAL PLUS) 27-1 MG TABS Take 1 tablet by mouth daily 3/19/21  Yes KYLIE Hickman CNP   ondansetron (ZOFRAN-ODT) 4 MG disintegrating tablet Take 1 tablet by mouth 3 times daily as needed for Nausea or Vomiting 3/19/21  Yes KYLIE Hickman CNP       Current medications:    Current Facility-Administered Medications   Medication Dose Route Frequency Provider Last Rate Last Admin    lactated ringers infusion   IntraVENous Continuous Karla Brink, APRN - CNM        lactated ringers bolus  500 mL IntraVENous PRN Karla Brink, APRN - CNM        Or    lactated ringers bolus  1,000 mL IntraVENous PRN Karla Brink, APRN -  mL/hr at 21 1509 1,000 mL at 21 1509    sodium chloride flush 0.9 % injection 5-40 mL  5-40 mL IntraVENous 2 times per day Karla Brink, APRN - CNM        sodium chloride flush 0.9 % injection 5-40 mL  5-40 mL IntraVENous PRN Karla Brink, APRN - CNM        0.9 % sodium chloride infusion  25 mL IntraVENous PRN Karla Brink, APRN - CNM        oxytocin (PITOCIN) 30 units in 500 mL infusion  1 pamela-units/min IntraVENous Continuous Karla Brink, APRN - CNM        oxytocin (PITOCIN) 30 units in 500 mL infusion  87.3 pamela-units/min IntraVENous Continuous PRN Karla Brink, APRN - CNM        And    oxytocin (PITOCIN) 30 units in 500 mL infusion  909 pamela-units/min IntraVENous PRN Karla Brink, APRN - CNM        butorphanol (STADOL) injection 1 mg  1 mg IntraVENous Q3H PRN Karla Brink, APRN - CNM        ondansetron [facility] injection 4 mg  4 mg IntraVENous Q6H PRN Hawk White, APRN - CNM        docusate sodium (COLACE) capsule 100 mg  100 mg Oral BID Hawk Altamont, APRN - CNM        penicillin G potassium [id number] units injection             dextrose 5 % infusion             penicillin G potassium IVPB 3 Million Units  3 Million Units IntraVENous Q4H Hawk Christopher, APRN - CNM        lidocaine PF 1 % injection                Allergies: Allergies   Allergen Reactions    No Known Allergies     Seasonal        Problem List:    Patient Active Problem List   Diagnosis Code    Sickle cell trait ([facility] D57.3    Term birth of  Z45.0   [facility] Term pregnancy Z34.90       Past Medical History:        Diagnosis Date    Anemia     Headache     Sickle cell trait ([facility]        Past Surgical History:  History reviewed. No pertinent surgical history.     Social History:    Social History     Tobacco Use    Smoking status: Current Every Day Smoker    Smokeless tobacco: Never Used    Tobacco comment: marijuana   Substance Use Topics    Alcohol use: Not Currently                                Ready to quit: Not Answered  Counseling given: Not Answered  Comment: marijuana      Vital Signs (Current):   Vitals:    21 1350 21 1434   BP: (!) 106/57    Pulse: 100    Resp: 16    Temp: 98.1 °F (36.7 °C)    Weight:  143 lb (64.9 kg)   Height:  5' 6\" (1.676 m)                                              BP Readings from Last 3 Encounters:   21 (!) 106/57   21 (!) 103/57   21 (!) 101/57       NPO Status: Time of last liquid consumption: 1330                        Time of last solid consumption: 2100                        Date of last liquid consumption: 21                        Date of last solid food consumption: 21    BMI:   Wt Readings from Last 3 Encounters:   21 143 lb (64.9 kg)   21 139 lb 6.4 oz (63.2 kg)   21 137 lb 6.4 oz (62.3 kg)     Body mass index is 23.08 kg/m².    CBC:   Lab Results   Component Value Date    WBC 7.2 08/31/2021    RBC 4.02 08/31/2021    HGB 8.5 08/31/2021    HCT 26.8 08/31/2021    MCV 66.7 08/31/2021    RDW 16.8 08/31/2021     08/31/2021       CMP:   Lab Results   Component Value Date     09/21/2020    K 3.2 09/21/2020     09/21/2020    CO2 19 09/21/2020    BUN 3 09/21/2020    CREATININE 0.5 09/21/2020    GFRAA >60 09/21/2020    LABGLOM >60 09/21/2020    GLUCOSE 133 09/21/2020    CALCIUM 9.1 09/21/2020       POC Tests: No results for input(s): POCGLU, POCNA, POCK, POCCL, POCBUN, POCHEMO, POCHCT in the last 72 hours. Coags: No results found for: PROTIME, INR, APTT    HCG (If Applicable):   Lab Results   Component Value Date    PREGTESTUR POSITIVE 03/19/2021        ABGs: No results found for: PHART, PO2ART, BAA5PRF, RMV9BJP, BEART, V3EMLKQW     Type & Screen (If Applicable):  No results found for: LABABO, LABRH    Drug/Infectious Status (If Applicable):  No results found for: HIV, HEPCAB    COVID-19 Screening (If Applicable): No results found for: COVID19        Anesthesia Evaluation  Patient summary reviewed  Airway: Mallampati: II  TM distance: >3 FB   Neck ROM: full  Mouth opening: > = 3 FB Dental: normal exam         Pulmonary:Negative Pulmonary ROS breath sounds clear to auscultation                             Cardiovascular:Negative CV ROS            Rhythm: regular  Rate: normal                    Neuro/Psych:   (+) headaches:,             GI/Hepatic/Renal: Neg GI/Hepatic/Renal ROS            Endo/Other:    (+) blood dyscrasia: anemia:., .                 Abdominal:       Abdomen: soft. Vascular: Other Findings:             Anesthesia Plan      epidural     ASA 2       Induction: intravenous. Anesthetic plan and risks discussed with patient. Plan discussed with CRNA.                   Kaushal Mcclain MD   8/31/2021

## 2021-08-31 NOTE — ANESTHESIA PROCEDURE NOTES
Epidural Block    Patient location during procedure: OB  Start time: 8/31/2021 4:00 PM  End time: 8/31/2021 4:10 PM  Reason for block: labor epidural  Staffing  Performed: resident/CRNA   Anesthesiologist: Brionna Pringle MD  Resident/CRNA: KYLIE Ramsey - CRNA  Preanesthetic Checklist  Completed: patient identified, IV checked, site marked, risks and benefits discussed, surgical consent, monitors and equipment checked, pre-op evaluation, timeout performed, anesthesia consent given, oxygen available and patient being monitored  Epidural  Patient position: sitting  Prep: Betadine and site prepped and draped  Patient monitoring: cardiac monitor, continuous pulse ox and frequent blood pressure checks  Approach: midline  Location: lumbar (1-5)  Injection technique: RENE saline  Guidance: paresthesia technique  Provider prep: mask and sterile gloves  Needle  Needle type: Tuohy   Needle gauge: 17 G  Needle length: 3.5 in  Needle insertion depth: 4.5 cm  Catheter type: end hole  Catheter size: 19 G  Catheter at skin depth: 9 cm  Test dose: negative  Assessment  Sensory level: T6  Hemodynamics: stable  Attempts: 1

## 2021-08-31 NOTE — H&P
Department of Obstetrics and Gynecology  [facility]  History and Physical        CHIEF COMPLAINT:  LOF abdominal pain      HISTORY OF PRESENT ILLNESS:          The patient is a 24 y.o. female , Patient's last menstrual period was 2020.,  at 39w0d. OB History        3    Para   2    Term   2            AB   0    Living   2       SAB   0    TAB        Ectopic        Molar        Multiple   0    Live Births   2            Patient presents with abdominal pain increasing in intensity and frequency, Denies VB reports FM. Pt reports LOF since 1100 AM clear. Reports prenatal care with Dr Stephen Thomson, and Brian Berg. Estimated Due Date: Estimated Date of Delivery: 21    PRENATAL CARE:    Complicated by:   Patient Active Problem List   Diagnosis Code    Sickle cell trait ([facility] D57.3    Term birth of  Z45.0   [facility] Term pregnancy Z34.90       PAST OB HISTORY  OB History        3    Para   2    Term   2            AB   0    Living   2       SAB   0    TAB        Ectopic        Molar        Multiple   0    Live Births   2                Past Medical History:        Diagnosis Date    Anemia     Headache     Sickle cell trait ([facility]      Past Surgical History:    History reviewed. No pertinent surgical history. Social History:    TOBACCO:   reports that she has been smoking. She has never used smokeless tobacco.  ETOH:   reports previous alcohol use. DRUGS:   reports previous drug use. Drug: Marijuana.   Family History:       Problem Relation Age of Onset    Bipolar Disorder Mother     Depression Mother     Schizophrenia Father     Bipolar Disorder Father      Medications Prior to Admission:  Medications Prior to Admission: Prenatal Vit-Fe Fumarate-FA (PRENATAL PLUS) 27-1 MG TABS, Take 1 tablet by mouth daily  ondansetron (ZOFRAN-ODT) 4 MG disintegrating tablet, Take 1 tablet by mouth 3 times daily as needed for Nausea or Vomiting  Allergies:  No known allergies and Seasonal    Review of Systems:   Ears, nose, mouth, throat, and face: negative  Respiratory: negative  Cardiovascular: negative  Gastrointestinal: negative  Genitourinary: LOF   Integument/breast: negative  Hematologic/lymphatic: negative  Musculoskeletal: Abdominal pain   Neurological: negative  Behavioral/Psych: negative  Endocrine: negative  Allergic/Immunologic: negative  Psychosocial: negative    PHYSICAL EXAM:    General appearance:  awake, alert, cooperative, no apparent distress, and appears stated age  Neurologic:  Awake, alert, oriented to name, place and time. Cranial nerves II-XII are grossly intact. and gait is normal.  Lungs:  No increased work of breathing, good air exchange  Heart:  regular rate and rhythm   Abdomen:  Gravid with normal bowel sounds, soft, non-distended, non-tender, no masses palpated  Pelvis:  External Genitalia: General appearance; normal, Hair distribution; normal, Lesions absent.     Fetal heart rate:  Category 1                                  Cervix:    DILATION: 3    EFFACEMENT:   80  STATION:  -2      Contraction frequency:  5-7  Membranes: SROM 1100    /64   Pulse 92   Temp 98.1 °F (36.7 °C)   Resp 16   Ht 5' 6\" (1.676 m)   Wt 143 lb (64.9 kg)   LMP 2020   SpO2 99%   BMI 23.08 kg/m²     General Labs:    Lab Results   Component Value Date    WBC 7.2 2021    HGB 8.5 (L) 2021    HCT 26.8 (L) 2021    MCV 66.7 (L) 2021     2021      Lab Results   Component Value Date     2021    K 3.7 2021     2021    CO2 22 2021    BUN 3 (L) 2021    CREATININE 0.5 2021    GLUCOSE 86 2021    CALCIUM 8.6 2021    LABGLOM >60 2021    GFRAA >60 2021       ASSESSMENT AND PLAN:  IUP 39  FHR category 1     SROM   Abdominal pain  GBS Unknown  Inconsistent Prenatal care      Admit   Epidural when desires   Attempted to collect prenatals [facility] office reports only seeing pt in beginning of pregnancy will send records. Dr Estefania Gottlieb office sent records. There is a gap in care from 29 weeks GA.       Electronically signed by KYLIE Saxena CNM on 8/31/2021 at 5:28 PM

## 2021-08-31 NOTE — PROGRESS NOTES
Baby girl delivered ,spont cry,moving all extremities well.apgars 9-9,wt is [id number],. Baby to mom to be held at 1810-stable.

## 2021-09-01 LAB
INTEGRITY CHECK, CREATININE, URINE: 52.2
INTEGRITY CHECK, OXIDANT, URINE: <40
INTEGRITY CHECK, PH, URINE: 6.2 (ref 4.5–9)
INTEGRITY CHECK, SPECIFIC GRAVITY, URINE: 1.01 (ref 1–1.03)
INTEGRITY CHECK, SPECIMEN INTEGRITY, URINE: NORMAL

## 2021-09-01 PROCEDURE — 99024 POSTOP FOLLOW-UP VISIT: CPT | Performed by: ADVANCED PRACTICE MIDWIFE

## 2021-09-01 PROCEDURE — 6370000000 HC RX 637 (ALT 250 FOR IP): Performed by: OBSTETRICS & GYNECOLOGY

## 2021-09-01 PROCEDURE — 1220000001 HC SEMI PRIVATE L&D R&B

## 2021-09-01 RX ADMIN — HYDROCODONE BITARTRATE AND ACETAMINOPHEN 2 TABLET: 5; 325 TABLET ORAL at 17:06

## 2021-09-01 RX ADMIN — IBUPROFEN 800 MG: 800 TABLET, FILM COATED ORAL at 10:30

## 2021-09-01 RX ADMIN — IBUPROFEN 800 MG: 800 TABLET, FILM COATED ORAL at 01:00

## 2021-09-01 RX ADMIN — DOCUSATE SODIUM 100 MG: 100 CAPSULE, LIQUID FILLED ORAL at 07:35

## 2021-09-01 RX ADMIN — HYDROCODONE BITARTRATE AND ACETAMINOPHEN 2 TABLET: 5; 325 TABLET ORAL at 07:35

## 2021-09-01 RX ADMIN — DOCUSATE SODIUM 100 MG: 100 CAPSULE, LIQUID FILLED ORAL at 20:27

## 2021-09-01 ASSESSMENT — PAIN SCALES - GENERAL
PAINLEVEL_OUTOF10: 7
PAINLEVEL_OUTOF10: 4
PAINLEVEL_OUTOF10: 3

## 2021-09-01 NOTE — PROGRESS NOTES
Recovery phase over at this time. LR and Pitocin discontinued. PT still with heavy legs, instructed to call when ready to get up to void and shower. Understanding verbalized.

## 2021-09-01 NOTE — PROGRESS NOTES
Assumed care of pt at this time, post partum recovery phase continues. VS and bleeding WNL. Warm blankets and ice chips provided. Baby in nursery and support person at bedside. Denies any needs or discomfort. Call light in reach, rest encouraged.

## 2021-09-01 NOTE — PROGRESS NOTES
RN to room, Pt assisted to BR to void and shower. Kristen care done. Ambulatory to room 214 for continued post partum care.

## 2021-09-01 NOTE — PROGRESS NOTES
Department of Obstetrics and Gynecology  Labor and Delivery  Post Partum Progress Note    Postpartum Day # 1    SUBJECTIVE:  Pt resting in bed, reports feeling well, denies emotional concerns. Infant feeding with out difficulty. Ambulatory per self.      OBJECTIVE:     Vitals:  BP (!) 113/59   Pulse 73   Temp 98.1 °F (36.7 °C) (Oral)   Resp 17   Ht 5' 6\" (1.676 m)   Wt 143 lb (64.9 kg)   LMP 12/01/2020   SpO2 100%   Breastfeeding Unknown   BMI 23.08 kg/m²     LUNGS:  No increased work of breathing, good air exchange, clear to auscultation bilaterally, no crackles or wheezing  CARDIOVASCULAR:  Normal apical impulse, regular rate and rhythm  ABDOMEN:  normal bowel sounds, soft and nontender   GENITAL/URINARY:  External Genitalia:  General appearance; normal, Hair distribution; normal, Lesions absent  Uterus: @  1 below  Minimal Lochia       CBC:    Lab Results   Component Value Date    WBC 7.2 08/31/2021    RBC 4.02 08/31/2021    HGB 8.5 08/31/2021    HCT 26.8 08/31/2021    MCV 66.7 08/31/2021    RDW 16.8 08/31/2021     08/31/2021       ASSESSMENT & PLAN:   PPD   SVB     Continue Current Care

## 2021-09-01 NOTE — CARE COORDINATION
SS NOTE: LAKE recd the SS Consult: \". ..+ marajuana. Bula Dayhoff Bula Dayhoff \" LAKE met with MOB,  named Corey Le and maternal aunt- Koffi Marie [phone] to complete the BEREKET/ Mandated  report today- SW explained to MOB what was involved and she related that she was familiar with the process. MOB tested positive for cannibis and the  did not. MOB relates that she resides at the [facility] address with her 2 other children-   2 yo Jennifer Bowers  and 2 yo Pavithra Kristieson. FOB- 44yo Atiya Pringle who has 9 other children is FOB to the  and Legend- he is in and out of the home. MOB relates that she will receive support from maternal aunt- [place]. MOB relates that she was under the care of Dr Alexander until that office gave her a 30 day notice to find another physician- she then began seeing weekly Dr Jovani Roa and his NP- thus she does not feel there was a lack of prenatal care. MOB relates that she started smoking Cannibis 4 years ago for appetite and sleep. She relates that the physicians told her to \". ..slow down. .\" and she did try to. She is requesting assistance with obstaining and SW offered [facility] - pt agreed and SW completed a referral there today. MOB relates that AKIRA Cordova provides the cannibis- she does not know where he obtains it from. MOB relates that she is ready to parent the  and has all of the necessary items to care for her at home including a car seat- she plans on bottle feeding the  and has enrolled in [facility]. SW shared resources with MOB today. LAKE called the BEREKET report into [address]-  [place]- who requests a cord stat on the  and will advise this  SW of the status of this case. SW will await further instruction from [facility] as to whether it is safe for the  to return home with MOB. LAKE will continue. NEEMSIO Null.2021.12:28 PM.

## 2021-09-01 NOTE — PLAN OF CARE
Problem: Anxiety:  Goal: Level of anxiety will decrease  Description: Level of anxiety will decrease  Outcome: Met This Shift     Problem: Breathing Pattern - Ineffective:  Goal: Able to breathe comfortably  Description: Able to breathe comfortably  Outcome: Met This Shift     Problem: Fluid Volume - Imbalance:  Goal: Absence of imbalanced fluid volume signs and symptoms  Description: Absence of imbalanced fluid volume signs and symptoms  Outcome: Met This Shift  Goal: Absence of intrapartum hemorrhage signs and symptoms  Description: Absence of intrapartum hemorrhage signs and symptoms  Outcome: Met This Shift     Problem: Infection - Intrapartum Infection:  Goal: Will show no infection signs and symptoms  Description: Will show no infection signs and symptoms  Outcome: Met This Shift     Problem: Labor Process - Prolonged:  Goal: Labor progression, first stage, within specified pattern  Description: Labor progression, first stage, within specified pattern  Outcome: Met This Shift  Goal: Labor progession, second stage, within specified pattern  Description: Labor progession, second stage, within specified pattern  Outcome: Met This Shift  Goal: Uterine contractions within specified parameters  Description: Uterine contractions within specified parameters  Outcome: Met This Shift     Problem:  Screening:  Goal: Ability to make informed decisions regarding treatment has improved  Description: Ability to make informed decisions regarding treatment has improved  Outcome: Met This Shift     Problem: Pain - Acute:  Goal: Pain level will decrease  Description: Pain level will decrease  Outcome: Met This Shift  Goal: Able to cope with pain  Description: Able to cope with pain  Outcome: Met This Shift     Problem: Tissue Perfusion - Uteroplacental, Altered:  Description: [TRUNCATED] For intrapartum patients with recurrent variable decelerations of the fetal heart rate, consider transcervical amnioinfusion. For patients in labor, avoid prophylactic use of continuous maternal oxygen supplementation to prevent nonreassu . .. Goal: Absence of abnormal fetal heart rate pattern  Description: Absence of abnormal fetal heart rate pattern  Outcome: Met This Shift     Problem: Urinary Retention:  Goal: Experiences of bladder distention will decrease  Description: Experiences of bladder distention will decrease  Outcome: Met This Shift  Goal: Urinary elimination within specified parameters  Description: Urinary elimination within specified parameters  Outcome: Met This Shift     Problem: Pain:  Description: Pain management should include both nonpharmacologic and pharmacologic interventions.   Goal: Pain level will decrease  Description: Pain level will decrease  Outcome: Met This Shift  Goal: Control of acute pain  Description: Control of acute pain  Outcome: Met This Shift  Goal: Control of chronic pain  Description: Control of chronic pain  Outcome: Met This Shift

## 2021-09-01 NOTE — CARE COORDINATION
SS NOTE: SW made contact with [facility] - Joyce Zavala, who relates that this  has been \". ..screened out. .. \". Thus  can be discharged home with MOB. NEMESIO Osorio.2021.4:03 PM.

## 2021-09-02 VITALS
BODY MASS INDEX: 22.98 KG/M2 | SYSTOLIC BLOOD PRESSURE: 115 MMHG | WEIGHT: 143 LBS | TEMPERATURE: 97.6 F | HEIGHT: 66 IN | DIASTOLIC BLOOD PRESSURE: 65 MMHG | OXYGEN SATURATION: 99 % | RESPIRATION RATE: 16 BRPM | HEART RATE: 72 BPM

## 2021-09-02 PROBLEM — Z34.90 TERM PREGNANCY: Status: RESOLVED | Noted: 2021-08-31 | Resolved: 2021-09-02

## 2021-09-02 LAB
COMMENT: NORMAL
THC NORMALIZED, QUANTITIATIVE, URINE: 164
THC-COOH, QUANTITATIVE, URINE: 85.6

## 2021-09-02 PROCEDURE — 6360000002 HC RX W HCPCS: Performed by: OBSTETRICS & GYNECOLOGY

## 2021-09-02 PROCEDURE — 90715 TDAP VACCINE 7 YRS/> IM: CPT | Performed by: OBSTETRICS & GYNECOLOGY

## 2021-09-02 PROCEDURE — 90471 IMMUNIZATION ADMIN: CPT | Performed by: OBSTETRICS & GYNECOLOGY

## 2021-09-02 PROCEDURE — 6370000000 HC RX 637 (ALT 250 FOR IP): Performed by: OBSTETRICS & GYNECOLOGY

## 2021-09-02 PROCEDURE — 99024 POSTOP FOLLOW-UP VISIT: CPT | Performed by: ADVANCED PRACTICE MIDWIFE

## 2021-09-02 RX ORDER — LANOLIN ALCOHOL/MO/W.PET/CERES
325 CREAM (GRAM) TOPICAL
Qty: 90 TABLET | Refills: 3 | Status: SHIPPED | OUTPATIENT
Start: 2021-09-02

## 2021-09-02 RX ORDER — IBUPROFEN 600 MG/1
600 TABLET ORAL EVERY 6 HOURS PRN
Qty: 30 TABLET | Refills: 0 | Status: SHIPPED | OUTPATIENT
Start: 2021-09-02

## 2021-09-02 RX ADMIN — IBUPROFEN 800 MG: 800 TABLET, FILM COATED ORAL at 08:28

## 2021-09-02 RX ADMIN — TETANUS TOXOID, REDUCED DIPHTHERIA TOXOID AND ACELLULAR PERTUSSIS VACCINE, ADSORBED 0.5 ML: 5; 2.5; 8; 8; 2.5 SUSPENSION INTRAMUSCULAR at 09:59

## 2021-09-02 RX ADMIN — DOCUSATE SODIUM 100 MG: 100 CAPSULE, LIQUID FILLED ORAL at 08:28

## 2021-09-02 ASSESSMENT — PAIN SCALES - GENERAL: PAINLEVEL_OUTOF10: 6

## 2021-09-02 NOTE — PROGRESS NOTES
CLINICAL PHARMACY NOTE: MEDS TO BEDS    Total # of Prescriptions Filled: 2   The following medications were delivered to the patient:  · Ibuprofen 600 mg  · Ferrous sulfate 325 mg    Additional Documentation:

## 2021-09-02 NOTE — PROGRESS NOTES
Continuing care of patient for 11p-7a shift, patient denies any needs at this time, call light within reach/

## 2021-09-02 NOTE — PLAN OF CARE
Problem: Discharge Planning:  Goal: Discharged to appropriate level of care  Description: Discharged to appropriate level of care  9/1/2021 2343 by Sky Armstrong RN  Outcome: Met This Shift  9/1/2021 1956 by Sky Armstrong RN  Outcome: Met This Shift     Problem: Constipation:  Goal: Bowel elimination is within specified parameters  Description: Bowel elimination is within specified parameters  9/1/2021 2343 by Sky Armstrong RN  Outcome: Met This Shift  9/1/2021 1956 by Sky Armstrong RN  Outcome: Met This Shift     Problem: Fluid Volume - Imbalance:  Goal: Absence of imbalanced fluid volume signs and symptoms  Description: Absence of imbalanced fluid volume signs and symptoms  9/1/2021 2343 by Sky Armstrong RN  Outcome: Met This Shift  9/1/2021 1956 by Sky Armstrong RN  Outcome: Met This Shift  Goal: Absence of postpartum hemorrhage signs and symptoms  Description: Absence of postpartum hemorrhage signs and symptoms  9/1/2021 2343 by Sky Armstrong RN  Outcome: Met This Shift  9/1/2021 1956 by Sky Armstrong RN  Outcome: Met This Shift     Problem: Infection - Risk of, Puerperal Infection:  Goal: Will show no infection signs and symptoms  Description: Will show no infection signs and symptoms  9/1/2021 2343 by Sky Armstrong RN  Outcome: Met This Shift  9/1/2021 1956 by Sky Armstrong RN  Outcome: Met This Shift     Problem: Mood - Altered:  Goal: Mood stable  Description: Mood stable  9/1/2021 2343 by Sky Armstrong RN  Outcome: Met This Shift  9/1/2021 1956 by Sky Armstrong RN  Outcome: Met This Shift     Problem: Pain - Acute:  Goal: Pain level will decrease  Description: Pain level will decrease  9/1/2021 2343 by Sky Armstrong RN  Outcome: Met This Shift  9/1/2021 1956 by Sky Armstrong RN  Outcome: Met This Shift

## 2021-09-02 NOTE — DISCHARGE SUMMARY
[facility] Obstetrical Discharge Form         Patients Name  Esteban Jaffe    Gestational Age:  39w0d    Antepartum complications: Providers in [facility]. @ [facility] and Amos Simmons difficulty receiving prenatals from Dr Amos Simmons office.      Date of Delivery:  21    Type of Delivery:   vaginal, spontaneous    Delivered By:                 Gerhardt Ruck:       Information for the patient's :  Paula Sea Girl Lefty Cain [[id number]]        Anesthesia:    Epidural        Intrapartum complications: None    Feeding method:   bottle - Similac with iron    Postpartum complications: anemia    Discharge Meds:       Medication List      START taking these medications    ferrous sulfate 325 (65 Fe) MG EC tablet  Commonly known as: Fe Tabs  Take 1 tablet by mouth 3 times daily (with meals)     ibuprofen 600 MG tablet  Commonly known as: ADVIL;MOTRIN  Take 1 tablet by mouth every 6 hours as needed for Pain        CONTINUE taking these medications    Prenatal Plus 27-1 MG Tabs  Take 1 tablet by mouth daily        STOP taking these medications    ondansetron 4 MG disintegrating tablet  Commonly known as: ZOFRAN-ODT           Where to Get Your Medications      These medications were sent to [address] [place] [address] - [facility] [phone]  [address] [place] [address]    Phone: [phone]   · ferrous sulfate 325 (65 Fe) MG EC tablet  · ibuprofen 600 MG tablet         Discharge Date:   2021  Condition:    Stable Home    Plan:   Follow up    in 6 week(s)

## 2021-09-02 NOTE — PROGRESS NOTES
Department of Obstetrics and Gynecology  Labor and Delivery  Post Partum Progress Note    Postpartum Day # 2    SUBJECTIVE:  Pt resting in bed, reports feeling well, denies emotional concerns. Infant feeding with out difficulty. Ambulatory per self. Pt desires DC home. Requested prenatal from Dr Amos Simmons office x 5. Office Staff continue to reports they are sending via fax. Requested this AM again. Pt reports sickle cell trait FOB neg. Denies Thalassemias.       OBJECTIVE:     Vitals:  /65   Pulse 72   Temp 97.6 °F (36.4 °C) (Oral)   Resp 16   Ht 5' 6\" (1.676 m)   Wt 143 lb (64.9 kg)   LMP 12/01/2020   SpO2 99%   Breastfeeding Unknown   BMI 23.08 kg/m²     LUNGS:  No increased work of breathing, good air exchange  CARDIOVASCULAR:  Normal apical impulse, regular rate and rhythm  ABDOMEN:  normal bowel sounds, soft and non tender   GENITAL/URINARY:  External Genitalia:  General appearance; normal, Hair distribution; normal, Lesions absent  Uterus: @ 2   Minimal Lochia     CBC:    Lab Results   Component Value Date    WBC 7.2 08/31/2021    RBC 4.02 08/31/2021    HGB 8.5 08/31/2021    HCT 26.8 08/31/2021    MCV 66.7 08/31/2021    RDW 16.8 08/31/2021     08/31/2021       ASSESSMENT & PLAN:   PPD   SVB   Anemia     DC home

## 2021-09-03 NOTE — ANESTHESIA POSTPROCEDURE EVALUATION
Department of Anesthesiology  Postprocedure Note    Patient: Kelli Rendon  MRN: [id number]  YOB: 2000  Date of evaluation: 9/3/2021  Time:  11:15 AM     Procedure Summary     Date: 08/31/21 Room / Location:     Anesthesia Start: 5645 Anesthesia Stop: 1800    Procedure: Labor Analgesia Diagnosis:     Scheduled Providers:  Responsible Provider: Jeremias Taylor MD    Anesthesia Type: epidural ASA Status: 2          Anesthesia Type: epidural    Jaylene Phase I:      Jaylene Phase II:      Last vitals: Reviewed and per EMR flowsheets.        Anesthesia Post Evaluation    Patient location during evaluation: PACU  Patient participation: complete - patient participated  Level of consciousness: awake  Pain score: 3  Airway patency: patent  Nausea & Vomiting: no nausea  Complications: no  Cardiovascular status: blood pressure returned to baseline  Respiratory status: acceptable  Hydration status: euvolemic

## 2023-04-21 NOTE — PROGRESS NOTES
Dr. Hiram Salgado at bedside, performing SVE. Dr. Hiram Salgado to discuss possibility of epidural with anesthesia. stated

## 2024-08-08 ENCOUNTER — OFFICE VISIT (OUTPATIENT)
Dept: FAMILY MEDICINE CLINIC | Age: 24
End: 2024-08-08

## 2024-08-08 VITALS
SYSTOLIC BLOOD PRESSURE: 94 MMHG | DIASTOLIC BLOOD PRESSURE: 60 MMHG | OXYGEN SATURATION: 98 % | HEIGHT: 66 IN | WEIGHT: 148 LBS | TEMPERATURE: 97.6 F | RESPIRATION RATE: 19 BRPM | BODY MASS INDEX: 23.78 KG/M2 | HEART RATE: 97 BPM

## 2024-08-08 DIAGNOSIS — K52.9 CHRONIC DIARRHEA: Primary | ICD-10-CM

## 2024-08-08 DIAGNOSIS — D50.9 MICROCYTIC ANEMIA: ICD-10-CM

## 2024-08-08 DIAGNOSIS — Z72.51 HIGH RISK HETEROSEXUAL BEHAVIOR: ICD-10-CM

## 2024-08-08 DIAGNOSIS — K52.9 CHRONIC DIARRHEA: ICD-10-CM

## 2024-08-08 LAB
ALBUMIN: 4.4 G/DL (ref 3.5–5.2)
ALP BLD-CCNC: 71 U/L (ref 35–104)
ALT SERPL-CCNC: 6 U/L (ref 0–32)
ANION GAP SERPL CALCULATED.3IONS-SCNC: 10 MMOL/L (ref 7–16)
AST SERPL-CCNC: 18 U/L (ref 0–31)
BASOPHILS ABSOLUTE: 0.03 K/UL (ref 0–0.2)
BASOPHILS RELATIVE PERCENT: 0 % (ref 0–2)
BILIRUB SERPL-MCNC: 0.5 MG/DL (ref 0–1.2)
BUN BLDV-MCNC: 7 MG/DL (ref 6–20)
C. TRACHOMATIS DNA ,URINE: NORMAL
CALCIUM SERPL-MCNC: 9.9 MG/DL (ref 8.6–10.2)
CHLORIDE BLD-SCNC: 103 MMOL/L (ref 98–107)
CO2: 24 MMOL/L (ref 22–29)
CREAT SERPL-MCNC: 0.5 MG/DL (ref 0.5–1)
EOSINOPHILS ABSOLUTE: 0.09 K/UL (ref 0.05–0.5)
EOSINOPHILS RELATIVE PERCENT: 1 % (ref 0–6)
FERRITIN: 127 NG/ML
GFR, ESTIMATED: >90 ML/MIN/1.73M2
GLUCOSE BLD-MCNC: 90 MG/DL (ref 74–99)
HCT VFR BLD CALC: 37.8 % (ref 34–48)
HEMOGLOBIN: 12.9 G/DL (ref 11.5–15.5)
IMMATURE GRANULOCYTES %: 0 % (ref 0–5)
IMMATURE GRANULOCYTES ABSOLUTE: <0.03 K/UL (ref 0–0.58)
IRON % SATURATION: 36 % (ref 15–50)
IRON: 108 UG/DL (ref 37–145)
LYMPHOCYTES ABSOLUTE: 1.8 K/UL (ref 1.5–4)
LYMPHOCYTES RELATIVE PERCENT: 26 % (ref 20–42)
MCH RBC QN AUTO: 26.9 PG (ref 26–35)
MCHC RBC AUTO-ENTMCNC: 34.1 G/DL (ref 32–34.5)
MCV RBC AUTO: 78.8 FL (ref 80–99.9)
MONOCYTES ABSOLUTE: 0.5 K/UL (ref 0.1–0.95)
MONOCYTES RELATIVE PERCENT: 7 % (ref 2–12)
N. GONORRHOEAE DNA, URINE: NORMAL
NEUTROPHILS ABSOLUTE: 4.55 K/UL (ref 1.8–7.3)
NEUTROPHILS RELATIVE PERCENT: 65 % (ref 43–80)
PDW BLD-RTO: 13.3 % (ref 11.5–15)
PLATELET # BLD: 295 K/UL (ref 130–450)
PMV BLD AUTO: 10.5 FL (ref 7–12)
POTASSIUM SERPL-SCNC: 4.3 MMOL/L (ref 3.5–5)
RBC # BLD: 4.8 M/UL (ref 3.5–5.5)
SODIUM BLD-SCNC: 137 MMOL/L (ref 132–146)
TOTAL IRON BINDING CAPACITY: 299 UG/DL (ref 250–450)
TOTAL PROTEIN: 8.3 G/DL (ref 6.4–8.3)
TSH SERPL DL<=0.05 MIU/L-ACNC: 0.76 UIU/ML (ref 0.27–4.2)
WBC # BLD: 7 K/UL (ref 4.5–11.5)

## 2024-08-08 SDOH — ECONOMIC STABILITY: FOOD INSECURITY: WITHIN THE PAST 12 MONTHS, THE FOOD YOU BOUGHT JUST DIDN'T LAST AND YOU DIDN'T HAVE MONEY TO GET MORE.: NEVER TRUE

## 2024-08-08 SDOH — ECONOMIC STABILITY: FOOD INSECURITY: WITHIN THE PAST 12 MONTHS, YOU WORRIED THAT YOUR FOOD WOULD RUN OUT BEFORE YOU GOT MONEY TO BUY MORE.: NEVER TRUE

## 2024-08-08 SDOH — ECONOMIC STABILITY: HOUSING INSECURITY
IN THE LAST 12 MONTHS, WAS THERE A TIME WHEN YOU DID NOT HAVE A STEADY PLACE TO SLEEP OR SLEPT IN A SHELTER (INCLUDING NOW)?: NO

## 2024-08-08 SDOH — ECONOMIC STABILITY: INCOME INSECURITY: HOW HARD IS IT FOR YOU TO PAY FOR THE VERY BASICS LIKE FOOD, HOUSING, MEDICAL CARE, AND HEATING?: NOT HARD AT ALL

## 2024-08-08 ASSESSMENT — PATIENT HEALTH QUESTIONNAIRE - PHQ9
SUM OF ALL RESPONSES TO PHQ QUESTIONS 1-9: 1
1. LITTLE INTEREST OR PLEASURE IN DOING THINGS: SEVERAL DAYS
2. FEELING DOWN, DEPRESSED OR HOPELESS: NOT AT ALL
SUM OF ALL RESPONSES TO PHQ QUESTIONS 1-9: 1
SUM OF ALL RESPONSES TO PHQ9 QUESTIONS 1 & 2: 1
SUM OF ALL RESPONSES TO PHQ QUESTIONS 1-9: 1
SUM OF ALL RESPONSES TO PHQ QUESTIONS 1-9: 1

## 2024-08-08 NOTE — PROGRESS NOTES
Attending Physician Statement    S:   Chief Complaint   Patient presents with    New Patient    Diarrhea    Annual Exam      PMH - sickle cell trait, iron deficiency   Diarrhea for 3 months.  Liquid BM's 4 times daily.  No previous problems.  Denies fever, chills, N/V.  Appetite ok.  Does have GERD symptoms (belching, burning substernally).  No travel or changes in meds, diet   Never had sickle cell crisis.  Does not take iron  O: Blood pressure 94/60, pulse 97, temperature 97.6 °F (36.4 °C), temperature source Temporal, resp. rate 19, height 1.676 m (5' 6\"), weight 67.1 kg (148 lb), last menstrual period 08/05/2024, SpO2 98 %, unknown if currently breastfeeding.   Exam:   Heart - RRR   Lungs - clear   Abdomen - active BS, nontender, some distention  A: Diarrhea, sickle cell trait with anemia  P:  CMP, TSH, stool studies (calprotectin, hemoccult), CBC, iron studies, anti TTG   Hepatitis screens, STD screens   Follow-up as ordered    I have discussed the case, including pertinent history and exam findings with the resident.  I also have personally seen and examined the patient.  I agree with the documented assessment and plan.    Hugo Sadler MD           
leg: No edema.   Skin:     General: Skin is warm and dry.   Neurological:      General: No focal deficit present.      Mental Status: She is alert. Mental status is at baseline.      Cranial Nerves: No cranial nerve deficit.      Sensory: No sensory deficit.      Motor: No weakness.      Gait: Gait normal.   Psychiatric:         Mood and Affect: Mood normal.         Behavior: Behavior normal.           ______________________________________________________________________    Assessment & Plan:  1. Chronic diarrhea  Differential includes electrolyte abnormalities v. Renal/hepatic abnormalities v. Thyrooid abnormalities v. Malabsorption syndrome v. Functional disorder  Advise monitoring food and symptoms   Check labs and stool calprotectin  - Comprehensive Metabolic Panel  - TSH  - Celiac Disease Panel; Future  - Calprotectin Stool; Future    2. Microcytic anemia  Hx microcytosis with alleged iron deficiency though no specific iron studies  Check iron studies and repeat CBC   - Ferritin  - Iron and TIBC  - CBC with Auto Differential    3. High risk heterosexual behavior  STD screening per pt request  - HIV Screen  - Hepatitis C Antibody  - Hepatitis B Surface Antibody  - Hepatitis B Surface Antigen  - RPR  - C.trachomatis N.gonorrhoeae DNA, Urine        Return to Office: Return in about 4 weeks (around 9/5/2024).    Jeremy Sebastian MD   This case was discussed with Dr. Sadler

## 2024-08-09 LAB
HBV SURFACE AB TITR SER: <3.1 MIU/ML (ref 0–9.99)
HEPATITIS B SURF AG,XHBAGS: NONREACTIVE
HEPATITIS C ANTIBODY: NONREACTIVE
RPR: NONREACTIVE

## 2024-08-12 LAB
C. TRACHOMATIS DNA ,URINE: NEGATIVE
GLIADIN DEAMINIDATED PEPTIDE AB IGA: 1.1 U/ML
GLIADIN DEAMINIDATED PEPTIDE AB IGG: <0.4 U/ML
HIV AG/AB: NONREACTIVE
IGA: 247 MG/DL (ref 70–400)
N. GONORRHOEAE DNA, URINE: NEGATIVE
TISSUE TRANSGLUTAMINASE IGA: 0.8 U/ML

## 2024-08-13 ENCOUNTER — TELEPHONE (OUTPATIENT)
Dept: FAMILY MEDICINE CLINIC | Age: 24
End: 2024-08-13

## 2024-08-13 NOTE — TELEPHONE ENCOUNTER
Attempted to call patient to update her on all results but neither of the phone numbers in her chart worked. Will send letter advising her to call the office.